# Patient Record
Sex: MALE | Race: BLACK OR AFRICAN AMERICAN | Employment: FULL TIME | URBAN - METROPOLITAN AREA
[De-identification: names, ages, dates, MRNs, and addresses within clinical notes are randomized per-mention and may not be internally consistent; named-entity substitution may affect disease eponyms.]

---

## 2017-01-26 ENCOUNTER — HOSPITAL ENCOUNTER (EMERGENCY)
Age: 22
Discharge: HOME | End: 2017-01-26
Attending: PEDIATRICS | Admitting: PEDIATRICS
Payer: SELF-PAY

## 2017-01-26 VITALS
WEIGHT: 192.68 LBS | RESPIRATION RATE: 18 BRPM | TEMPERATURE: 98.2 F | DIASTOLIC BLOOD PRESSURE: 77 MMHG | BODY MASS INDEX: 23.15 KG/M2 | SYSTOLIC BLOOD PRESSURE: 122 MMHG | HEART RATE: 74 BPM

## 2017-01-26 DIAGNOSIS — R21 RASH: Primary | ICD-10-CM

## 2017-01-26 PROCEDURE — 99282 EMERGENCY DEPT VISIT SF MDM: CPT

## 2017-01-26 RX ORDER — TRIAMCINOLONE ACETONIDE 1 MG/G
OINTMENT TOPICAL 2 TIMES DAILY
Qty: 30 G | Refills: 1 | Status: SHIPPED | OUTPATIENT
Start: 2017-01-26 | End: 2021-03-02 | Stop reason: CLARIF

## 2017-01-26 NOTE — ED PROVIDER NOTES
HPI Comments: 24 y/o AA male presents to the ED for the evaluation of rash to bilateral feet. According to patient, the rash started about one month ago. Patient states the rash is dry and itchy. It went away on its own but then returned. Has been putting anti-fungal cream on it with no relief. He does mention he plays a lot of basketball. Cannot recall any new detergents, clothes, socks or other possible irritants. No other acute medical complaints expressed at this time. The history is provided by the patient. Past Medical History:   Diagnosis Date    Asthma        Past Surgical History:   Procedure Laterality Date    Hx tonsillectomy  2005?  Hx orthopaedic       left knee surgery         History reviewed. No pertinent family history. Social History     Social History    Marital status: SINGLE     Spouse name: N/A    Number of children: N/A    Years of education: N/A     Occupational History    Not on file. Social History Main Topics    Smoking status: Never Smoker    Smokeless tobacco: Never Used    Alcohol use No    Drug use: Not on file    Sexual activity: Not on file     Other Topics Concern    Not on file     Social History Narrative    ** Merged History Encounter **              ALLERGIES: Grape; Milk; Nickel; and Wheat    Review of Systems   Constitutional: Negative. Negative for chills and fever. HENT: Negative. Eyes: Negative. Cardiovascular: Negative for leg swelling. Gastrointestinal: Negative for abdominal pain, nausea and vomiting. Genitourinary: Negative. Musculoskeletal: Negative. Skin: Positive for rash. Negative for wound. Neurological: Negative for light-headedness. Psychiatric/Behavioral: Negative. All other systems reviewed and are negative.       Vitals:    01/26/17 1104 01/26/17 1111   BP:  122/77   Pulse:  74   Resp:  18   Temp:  98.2 °F (36.8 °C)   Weight: 87.4 kg (192 lb 10.9 oz)             Physical Exam   Constitutional: He is oriented to person, place, and time. He appears well-developed and well-nourished. No distress. HENT:   Head: Normocephalic and atraumatic. Eyes: Conjunctivae and EOM are normal. Pupils are equal, round, and reactive to light. Neck: Normal range of motion. Neck supple. Cardiovascular: Normal rate, regular rhythm, normal heart sounds and intact distal pulses. Exam reveals no gallop and no friction rub. No murmur heard. Pulmonary/Chest: Effort normal and breath sounds normal. No respiratory distress. He has no wheezes. He has no rales. He exhibits no tenderness. Musculoskeletal: Normal range of motion. Neurological: He is alert and oriented to person, place, and time. He has normal reflexes. Skin: Skin is warm and dry. Rash (dry, scaly erythematous plaque on bilateral feet with some weeping.  ) noted. No erythema. Psychiatric: He has a normal mood and affect. His behavior is normal.   Nursing note and vitals reviewed. MDM  ED Course       Procedures    Rash concerning for ?? contact dermatitis, however patient has hx of eczema so could be eczema flare.   Will d/c home with triamcinolone ointment and podiatry referral if symptoms persist  Patient verbalizes understanding of dx and is aware of what s/sx to monitor that would warrant return visit to ED  Discussed with Dr. Michelle Sogn

## 2017-01-26 NOTE — DISCHARGE INSTRUCTIONS

## 2017-02-18 ENCOUNTER — APPOINTMENT (OUTPATIENT)
Dept: GENERAL RADIOLOGY | Age: 22
End: 2017-02-18
Attending: NURSE PRACTITIONER
Payer: SELF-PAY

## 2017-02-18 ENCOUNTER — HOSPITAL ENCOUNTER (EMERGENCY)
Age: 22
Discharge: HOME | End: 2017-02-18
Attending: PEDIATRICS
Payer: SELF-PAY

## 2017-02-18 VITALS
OXYGEN SATURATION: 99 % | SYSTOLIC BLOOD PRESSURE: 142 MMHG | DIASTOLIC BLOOD PRESSURE: 90 MMHG | BODY MASS INDEX: 23.25 KG/M2 | RESPIRATION RATE: 16 BRPM | HEART RATE: 78 BPM | WEIGHT: 193.56 LBS | TEMPERATURE: 98.5 F

## 2017-02-18 DIAGNOSIS — M62.838 MUSCLE SPASM: ICD-10-CM
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
## 2017-02-18 DIAGNOSIS — M25.562 ACUTE PAIN OF LEFT KNEE: ICD-10-CM

## 2017-02-18 PROCEDURE — 99283 EMERGENCY DEPT VISIT LOW MDM: CPT

## 2017-02-18 PROCEDURE — 74011250637 HC RX REV CODE- 250/637: Performed by: NURSE PRACTITIONER

## 2017-02-18 PROCEDURE — 73562 X-RAY EXAM OF KNEE 3: CPT

## 2017-02-18 RX ORDER — BUTALBITAL, ACETAMINOPHEN AND CAFFEINE 50; 325; 40 MG/1; MG/1; MG/1
1 TABLET ORAL
Qty: 20 TAB | Refills: 0 | Status: SHIPPED | OUTPATIENT
Start: 2017-02-18 | End: 2018-04-10

## 2017-02-18 RX ORDER — CYCLOBENZAPRINE HCL 10 MG
10 TABLET ORAL
Qty: 20 TAB | Refills: 0 | Status: SHIPPED | OUTPATIENT
Start: 2017-02-18 | End: 2018-04-10

## 2017-02-18 RX ORDER — IBUPROFEN 800 MG/1
800 TABLET ORAL
Qty: 30 TAB | Refills: 0 | Status: SHIPPED | OUTPATIENT
Start: 2017-02-18 | End: 2017-02-23

## 2017-02-18 RX ORDER — BUTALBITAL, ACETAMINOPHEN AND CAFFEINE 50; 325; 40 MG/1; MG/1; MG/1
2 TABLET ORAL
Status: COMPLETED | OUTPATIENT
Start: 2017-02-18 | End: 2017-02-18

## 2017-02-18 RX ORDER — CYCLOBENZAPRINE HCL 10 MG
10 TABLET ORAL
Status: COMPLETED | OUTPATIENT
Start: 2017-02-18 | End: 2017-02-18

## 2017-02-18 RX ORDER — IBUPROFEN 400 MG/1
800 TABLET ORAL
Status: COMPLETED | OUTPATIENT
Start: 2017-02-18 | End: 2017-02-18

## 2017-02-18 RX ADMIN — IBUPROFEN 800 MG: 400 TABLET, FILM COATED ORAL at 22:45

## 2017-02-18 RX ADMIN — BUTALBITAL, ACETAMINOPHEN AND CAFFEINE 2 TABLET: 50; 325; 40 TABLET ORAL at 22:45

## 2017-02-18 RX ADMIN — CYCLOBENZAPRINE HYDROCHLORIDE 10 MG: 10 TABLET, FILM COATED ORAL at 22:45

## 2017-02-18 NOTE — LETTER
[address] 
[address] [facility] DEPT 
[address] [place] [address] [facility] [id number] 
[phone] Work/School Note Date: 2/18/2017 To Whom It May concern: 
 
Allen Cohen was seen and treated today in the emergency room by the following provider(s): 
Attending Provider: Nikki Schwartz MD 
Nurse Practitioner: Dennys Guerrero NP. Allen Cohen may return to work on 2/20/17. Sincerely, Dennys Guerrero NP

## 2017-02-19 NOTE — ED NOTES
Pt discharged home with parent/guardian. Pt acting age appropriately, respirations regular and unlabored, ambulates with steady gait. No further complaints at this time. Parent/guardian verbalized understanding of discharge paperwork and has no further questions at this time. Education provided about continuation of care, follow up care and medication administration. Parent/guardian able to provided teach back about discharge instructions.
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
## 2017-02-23 ENCOUNTER — HOSPITAL ENCOUNTER (EMERGENCY)
Age: 22
Discharge: HOME | End: 2017-02-23
Attending: PEDIATRICS
Payer: SUBSIDIZED

## 2017-02-23 VITALS
OXYGEN SATURATION: 99 % | BODY MASS INDEX: 23.15 KG/M2 | TEMPERATURE: 98.1 F | HEART RATE: 78 BPM | SYSTOLIC BLOOD PRESSURE: 116 MMHG | DIASTOLIC BLOOD PRESSURE: 63 MMHG | RESPIRATION RATE: 16 BRPM | WEIGHT: 192.68 LBS

## 2017-02-23 DIAGNOSIS — J02.9 PHARYNGITIS, UNSPECIFIED ETIOLOGY: Primary | ICD-10-CM

## 2017-02-23 LAB — S PYO AG THROAT QL: NEGATIVE

## 2017-02-23 PROCEDURE — 74011250637 HC RX REV CODE- 250/637: Performed by: PEDIATRICS

## 2017-02-23 PROCEDURE — 99283 EMERGENCY DEPT VISIT LOW MDM: CPT

## 2017-02-23 PROCEDURE — 87070 CULTURE OTHR SPECIMN AEROBIC: CPT | Performed by: PEDIATRICS

## 2017-02-23 PROCEDURE — 87880 STREP A ASSAY W/OPTIC: CPT

## 2017-02-23 RX ORDER — IBUPROFEN 400 MG/1
800 TABLET ORAL
Status: COMPLETED | OUTPATIENT
Start: 2017-02-23 | End: 2017-02-23

## 2017-02-23 RX ORDER — IBUPROFEN 800 MG/1
800 TABLET ORAL
Qty: 50 TAB | Refills: 0 | Status: SHIPPED | OUTPATIENT
Start: 2017-02-23 | End: 2018-09-18

## 2017-02-23 RX ADMIN — IBUPROFEN 800 MG: 400 TABLET, FILM COATED ORAL at 22:23

## 2017-02-24 NOTE — ED NOTES
Pt discharged home. Pt acting age appropriately, respirations regular and unlabored. No distress noted. No further complaints at this time. Pt verbalized understanding of discharge paperwork and has no further questions at this time. Education provided about continuation of care, follow up care with PCP and medication administration. Pt able to provided teach back about discharge instructions.

## 2017-02-24 NOTE — ED TRIAGE NOTES
Pt with sore throat when he swallows since yesterday. Pt denies fever. States something white in throat.

## 2017-02-24 NOTE — ED PROVIDER NOTES
Patient is a 25 y.o. male presenting with sore throat. The history is provided by the patient. Sore Throat    This is a new problem. The current episode started yesterday. The problem has not changed since onset. There has been no fever. Associated symptoms include headaches. Pertinent negatives include no diarrhea, no vomiting, no congestion, no drooling, no ear discharge, no ear pain, no plugged ear sensation, no shortness of breath, no stridor, no swollen glands, no trouble swallowing, no stiff neck and no cough. He has had no exposure to strep or mono. He has tried nothing for the symptoms. History of tonsillectomy 11 years ago. No trauma    Past Medical History:   Diagnosis Date    Asthma        Past Surgical History:   Procedure Laterality Date    HX ORTHOPAEDIC      left knee surgery    HX TONSILLECTOMY  2005? History reviewed. No pertinent family history. Social History     Social History    Marital status: SINGLE     Spouse name: N/A    Number of children: N/A    Years of education: N/A     Occupational History    Not on file. Social History Main Topics    Smoking status: Current Some Day Smoker    Smokeless tobacco: Never Used    Alcohol use No    Drug use: Not on file    Sexual activity: Not on file     Other Topics Concern    Not on file     Social History Narrative    ** Merged History Encounter **              ALLERGIES: Grape; Milk; Nickel; and Wheat    Review of Systems   HENT: Positive for sore throat. Negative for congestion, drooling, ear discharge, ear pain and trouble swallowing. Respiratory: Negative for cough, shortness of breath and stridor. Gastrointestinal: Negative for diarrhea and vomiting. Neurological: Positive for headaches. All other systems reviewed and are negative.       Vitals:    02/23/17 2154   BP: 116/63   Pulse: 78   Resp: 16   Temp: 98.1 °F (36.7 °C)   SpO2: 99%   Weight: 87.4 kg (192 lb 10.9 oz)            Physical Exam   Physical Exam   Constitutional: Appears well-developed and well-nourished. Active. No distress. HENT:   Head: NCAT  Ears: Right Ear: Tympanic membrane normal. Left Ear: Tympanic membrane normal.   Nose: Nose normal. No nasal discharge. turbinates boggy  Mouth/Throat: Mucous membranes are moist. Pharynx is injected. No tonsils. Eyes: Conjunctivae are normal. Right eye exhibits no discharge. Left eye exhibits no discharge. Neck: Normal range of motion. Neck supple. Cardiovascular: Normal rate, regular rhythm, S1 normal and S2 normal.  .       2+ distal pulses   Pulmonary/Chest: Effort normal and breath sounds normal. No nasal flaring or stridor. No respiratory distress. no wheezes. no rhonchi. no rales. no retraction. Abdominal: Soft. . No tenderness. no guarding. No hernia. No masses or HSM  Musculoskeletal: Normal range of motion. no edema, no tenderness, no deformity and no signs of injury. Lymphadenopathy:     no cervical adenopathy. Neurological:  alert. normal strength. normal muscle tone. No focal defecits  Skin: Skin is warm and dry. Capillary refill takes less than 3 seconds. Turgor is normal. No petechiae, no purpura and no rash noted. No cyanosis. MDM  ED Course   Patient is well hydrated, well appearing, and in no respiratory distress. Physical exam is reassuring, and without signs of serious illness. Rapid strep negative. No trismus, stridor or increased work of breathing associated with sore throat. Differential diagnosis includes viral pharyngitis, mononucleosis, strep pharyngitis with negative POC strep. Will discharge with supportive care pending throat culture. Also, Symptoms likely secondary to post-nasal drainage due to allergic rhinitis. Will discharge patient home with flonase and zyrtec, and follow-up with PCP within the next few days. ICD-10-CM ICD-9-CM   1.  Pharyngitis, unspecified etiology J02.9 462       Current Discharge Medication List      START taking these medications Details   Cetirizine (ZYRTEC) 10 mg cap Take 10 mg by mouth daily. Qty: 30 Cap, Refills: 0         CONTINUE these medications which have CHANGED    Details   ibuprofen (MOTRIN) 800 mg tablet Take 1 Tab by mouth every six (6) hours as needed for Pain. Qty: 50 Tab, Refills: 0         CONTINUE these medications which have NOT CHANGED    Details   triamcinolone acetonide (KENALOG) 0.1 % ointment Apply  to affected area two (2) times a day. use thin layer to affected area  Qty: 30 g, Refills: 1      cyclobenzaprine (FLEXERIL) 10 mg tablet Take 1 Tab by mouth three (3) times daily as needed for Muscle Spasm(s). Qty: 20 Tab, Refills: 0      butalbital-acetaminophen-caffeine (FIORICET, ESGIC) -40 mg per tablet Take 1 Tab by mouth every six (6) hours as needed for Pain or Headache. Max Daily Amount: 4 Tabs. Qty: 20 Tab, Refills: 0      EPINEPHrine (AUVI-Q) 0.3 mg/0.3 mL (1:1,000) injection 0.3 mL by IntraMUSCular route once as needed for up to 1 dose. Qty: 0.6 mL, Refills: 0      montelukast (SINGULAIR) 10 mg tablet Take 10 mg by mouth daily. albuterol (PROVENTIL HFA, VENTOLIN HFA) 90 mcg/actuation inhaler Take  by inhalation. fluticasone (FLONASE) 50 mcg/actuation nasal spray nightly. albuterol sulfate (PROVENTIL;VENTOLIN) 2.5 mg/0.5 mL Nebu nebulizer solution by Nebulization route once. Follow-up Information     Follow up With Details Comments [address], [place] In 3 days  [address] [facility] [address]  [phone]            I have reviewed discharge instructions with the patient. The patient verbalized understanding. 10:42 PM  Gela Gamino M.D.       Procedures

## 2017-02-24 NOTE — DISCHARGE INSTRUCTIONS
Please continue taking your flonase and start Zyrtec as well. You have some mild post nasal drip as well. Sore Throat: Care Instructions  Your Care Instructions    Infection by bacteria or a virus causes most sore throats. Cigarette smoke, dry air, air pollution, allergies, and yelling can also cause a sore throat. Sore throats can be painful and annoying. Fortunately, most sore throats go away on their own. If you have a bacterial infection, your doctor may prescribe antibiotics. Follow-up care is a key part of your treatment and safety. Be sure to make and go to all appointments, and call your doctor if you are having problems. It's also a good idea to know your test results and keep a list of the medicines you take. How can you care for yourself at home? · If your doctor prescribed antibiotics, take them as directed. Do not stop taking them just because you feel better. You need to take the full course of antibiotics. · Gargle with warm salt water once an hour to help reduce swelling and relieve discomfort. Use 1 teaspoon of salt mixed in 1 cup of warm water. · Take an over-the-counter pain medicine, such as acetaminophen (Tylenol), ibuprofen (Advil, Motrin), or naproxen (Aleve). Read and follow all instructions on the label. · Be careful when taking over-the-counter cold or flu medicines and Tylenol at the same time. Many of these medicines have acetaminophen, which is Tylenol. Read the labels to make sure that you are not taking more than the recommended dose. Too much acetaminophen (Tylenol) can be harmful. · Drink plenty of fluids. Fluids may help soothe an irritated throat. Hot fluids, such as tea or soup, may help decrease throat pain. · Use over-the-counter throat lozenges to soothe pain. Regular cough drops or hard candy may also help. These should not be given to young children because of the risk of choking. · Do not smoke or allow others to smoke around you.  If you need help quitting, talk to your doctor about stop-smoking programs and medicines. These can increase your chances of quitting for good. · Use a vaporizer or humidifier to add moisture to your bedroom. Follow the directions for cleaning the machine. When should you call for help? Call your doctor now or seek immediate medical care if:  · You have new or worse trouble swallowing. · Your sore throat gets much worse on one side. Watch closely for changes in your health, and be sure to contact your doctor if you do not get better as expected. Where can you learn more? Go to http://kala-maura.info/. Enter [phone] [id number] in the search box to learn more about \"Sore Throat: Care Instructions. \"  Current as of: July 29, 2016  Content Version: 11.1  © 6309-0725 [employer]. Care instructions adapted under license by [employer] (which disclaims liability or warranty for this information). If you have questions about a medical condition or this instruction, always ask your healthcare professional. [place] [address] any warranty or liability for your use of this information. Rhinitis: Care Instructions  Your Care Instructions  Rhinitis is swelling and irritation in the nose. Allergies and infections are often the cause. Your nose may run or feel stuffy. Other symptoms are itchy and sore eyes, ears, throat, and mouth. If allergies are the cause, your doctor may do tests to find out what you are allergic to. You may be able to stop symptoms if you avoid the things that cause them. Your doctor may suggest or prescribe medicine to ease your symptoms. Follow-up care is a key part of your treatment and safety. Be sure to make and go to all appointments, and call your doctor if you are having problems. It's also a good idea to know your test results and keep a list of the medicines you take. How can you care for yourself at home?   · If your rhinitis is caused by allergies, try to find out what sets off (triggers) your symptoms. Take steps to avoid these triggers. ¨ Avoid yard work. It can stir up both pollen and mold. ¨ Do not smoke or allow others to smoke around you. If you need help quitting, talk to your doctor about stop-smoking programs and medicines. These can increase your chances of quitting for good. ¨ Do not use aerosol sprays, cleaning products, or perfumes. ¨ If pollen is one of your triggers, close your house and car windows during blooming season. ¨ Clean your house often to control dust.  ¨ Keep pets outside. · If your doctor recommends over-the-counter medicines to relieve symptoms, take your medicines exactly as prescribed. Call your doctor if you think you are having a problem with your medicine. · Use saline (saltwater) nasal washes to help keep your nasal passages open and wash out mucus and bacteria. You can buy saline nose drops at a grocery store or drugstore. Or you can make your own at home by adding 1 teaspoon of salt and 1 teaspoon of baking soda to 2 cups of distilled water. If you make your own, fill a bulb syringe with the solution, insert the tip into your nostril, and squeeze gently. Dung Opitz your nose. When should you call for help? Call your doctor now or seek immediate medical care if:  · You are having trouble breathing. Watch closely for changes in your health, and be sure to contact your doctor if:  · Mucus from your nose gets thicker (like pus) or has new blood in it. · You have new or worse symptoms. · You do not get better as expected. Where can you learn more? Go to http://kala-maura.info/. Enter M030 in the search box to learn more about \"Rhinitis: Care Instructions. \"  Current as of: July 29, 2016  Content Version: 11.1  © 2096-6765 [employer]. Care instructions adapted under license by [employer] (which disclaims liability or warranty for this information).  If you have questions about a medical condition or this instruction, always ask your healthcare professional. [place] [address] any warranty or liability for your use of this information. We hope that we have addressed all of your medical concerns. The examination and treatment you received in the Emergency Department were for an emergent problem and were not intended as complete care. It is important that you follow up with your healthcare provider(s) for ongoing care. If your symptoms worsen or do not improve as expected, and you are unable to reach your usual health care provider(s), you should return to the Emergency Department. Today's healthcare is undergoing tremendous change, and patient satisfaction surveys are one of the many tools to assess the quality of medical care. You may receive a survey from the [employer] regarding your experience in the Emergency Department. I hope that your experience has been completely positive, particularly the medical care that I provided. As such, please participate in the survey; anything less than excellent does not meet my expectations or intentions. Thank you for allowing us to provide you with medical care today. We realize that you have many choices for your emergency care needs. Please choose us in the future for any continued health care needs.       Anthony Gloria MD    [address] [facility].   Office: [phone]            Recent Results (from the past 24 hour(s))   POC GROUP A STREP    Collection Time: 02/23/17 10:30 PM   Result Value Ref Range    Group A strep (POC) NEGATIVE  NEG

## 2017-02-25 LAB
BACTERIA SPEC CULT: NORMAL
SERVICE CMNT-IMP: NORMAL

## 2018-04-10 ENCOUNTER — APPOINTMENT (OUTPATIENT)
Dept: GENERAL RADIOLOGY | Age: 23
End: 2018-04-10
Attending: PHYSICIAN ASSISTANT
Payer: COMMERCIAL

## 2018-04-10 ENCOUNTER — HOSPITAL ENCOUNTER (EMERGENCY)
Age: 23
Discharge: HOME | End: 2018-04-10
Attending: EMERGENCY MEDICINE | Admitting: EMERGENCY MEDICINE
Payer: COMMERCIAL

## 2018-04-10 VITALS
RESPIRATION RATE: 14 BRPM | OXYGEN SATURATION: 100 % | SYSTOLIC BLOOD PRESSURE: 153 MMHG | WEIGHT: 180.56 LBS | BODY MASS INDEX: 21.69 KG/M2 | TEMPERATURE: 98 F | HEART RATE: 74 BPM | DIASTOLIC BLOOD PRESSURE: 81 MMHG

## 2018-04-10 DIAGNOSIS — R07.81 RIB PAIN ON LEFT SIDE: Primary | ICD-10-CM

## 2018-04-10 PROCEDURE — 99282 EMERGENCY DEPT VISIT SF MDM: CPT

## 2018-04-10 PROCEDURE — 71101 X-RAY EXAM UNILAT RIBS/CHEST: CPT

## 2018-04-10 RX ORDER — CYCLOBENZAPRINE HCL 10 MG
10 TABLET ORAL
Qty: 10 TAB | Refills: 0 | Status: SHIPPED | OUTPATIENT
Start: 2018-04-10 | End: 2018-07-25

## 2018-04-10 NOTE — ED NOTES
Assumed care of pt. Pt resting comfortably on stretcher at this time. Pt updated on plan of care and denies any needs at this time. Will continue to monitor.

## 2018-04-10 NOTE — ED PROVIDER NOTES
HPI Comments: Melissa Nicholas is a 23 y.o. male  who presents by private vehicle to ER with c/o Patient presents with:  Rib Pain. Patient with left rib pain x 1.5 months. Patient reports falling back while playing basketball 1.5 months ago. Denies any shortness of breath. Patient has not tried any medication for pain. Patient is UTD on immunizations and denies any significant medical history. He specifically denies any fevers, chills, nausea, vomiting, chest pain, shortness of breath, headache, rash, diarrhea, abdominal pain, urinary/bowel changes, sweating or weight loss. PCP: None   PMHx significant for: Past Medical History:  No date: Asthma   PSHx significant for: Past Surgical History:  No date: HX ORTHOPAEDIC      Comment: left knee surgery  2005?: HX TONSILLECTOMY  Social Hx: Tobacco use: Smoking status: Current Some Day Smoker                                                    Packs/day: 0.00      Years: 0.00      Smokeless status: Never Used                      ; EtOH use: The patient states he drinks 0 per week.; Illicit Drug use: Allergies:   -- Grape -- Other (comments)    --  Per allergy testing   -- Milk -- Other (comments)    --  Per allergy testing   -- Nickel -- Rash   -- Wheat -- Other (comments)    --  Per allergy testing    There are no other complaints, changes or physical findings at this time. Patient is a 23 y.o. male presenting with rib pain. The history is provided by the patient. Rib Pain   This is a new problem. The current episode started more than 1 week ago. The problem has not changed since onset. Associated symptoms include chest pain. Pertinent negatives include no fever, no coryza, no swollen glands, no cough, no wheezing, no abdominal pain, no leg swelling and no claudication. He has tried nothing for the symptoms. He has had no prior hospitalizations. He has had no prior ED visits. He has had no prior ICU admissions.  Associated medical issues do not include asthma, COPD, chronic lung disease, past MI or DVT. Past Medical History:   Diagnosis Date    Asthma        Past Surgical History:   Procedure Laterality Date    HX ORTHOPAEDIC      left knee surgery    HX TONSILLECTOMY  2005? History reviewed. No pertinent family history. Social History     Social History    Marital status: SINGLE     Spouse name: N/A    Number of children: N/A    Years of education: N/A     Occupational History    Not on file. Social History Main Topics    Smoking status: Current Some Day Smoker    Smokeless tobacco: Never Used    Alcohol use No    Drug use: Not on file    Sexual activity: Not on file     Other Topics Concern    Not on file     Social History Narrative    ** Merged History Encounter **              ALLERGIES: Grape; Milk; Nickel; and Wheat    Review of Systems   Constitutional: Negative. Negative for fever. HENT: Negative. Eyes: Negative. Respiratory: Negative. Negative for cough and wheezing. Cardiovascular: Positive for chest pain. Negative for claudication and leg swelling. Gastrointestinal: Negative. Negative for abdominal pain. Endocrine: Negative. Genitourinary: Negative. Musculoskeletal: Negative. Skin: Negative. Allergic/Immunologic: Negative. Neurological: Negative. Hematological: Negative. Psychiatric/Behavioral: Negative. All other systems reviewed and are negative. Vitals:    04/10/18 1913 04/10/18 1914   BP: 153/81    Pulse: 74    Resp: 14    Temp: 98 °F (36.7 °C)    SpO2: 100%    Weight:  81.9 kg (180 lb 8.9 oz)            Physical Exam   Constitutional: He is oriented to person, place, and time. He appears well-developed and well-nourished. HENT:   Head: Normocephalic and atraumatic. Right Ear: External ear normal.   Left Ear: External ear normal.   Mouth/Throat: Oropharynx is clear and moist. No oropharyngeal exudate.    Eyes: Conjunctivae and EOM are normal. Pupils are equal, round, and reactive to light. Right eye exhibits no discharge. Left eye exhibits no discharge. No scleral icterus. Neck: Normal range of motion. Neck supple. No tracheal deviation present. No thyromegaly present. Cardiovascular: Normal rate, regular rhythm, normal heart sounds and intact distal pulses. No murmur heard. Pulmonary/Chest: Effort normal and breath sounds normal. No respiratory distress. He has no wheezes. He has no rales. Abdominal: Soft. Bowel sounds are normal. He exhibits no distension. There is no tenderness. There is no rebound and no guarding. Musculoskeletal: Normal range of motion. He exhibits no edema or tenderness. Lymphadenopathy:     He has no cervical adenopathy. Neurological: He is alert and oriented to person, place, and time. No cranial nerve deficit. Coordination normal.   Skin: Skin is warm. No rash noted. No erythema. Psychiatric: He has a normal mood and affect. His behavior is normal. Judgment and thought content normal.   Nursing note and vitals reviewed. MDM  Number of Diagnoses or Management Options  Rib pain on left side:   Diagnosis management comments: Assesment/Plan- 23 y.o. Patient presents with:  Rib Pain  differential includes: contusion, rib fracture. imaging reviewed with no acute findings. Recommend PCP follow up. Patient educated on reasons to return to the ED.          Amount and/or Complexity of Data Reviewed  Tests in the radiology section of CPT®: ordered and reviewed          ED Course       Procedures

## 2018-04-10 NOTE — ED NOTES
Bedside and Verbal shift change report given to Santa Aguirre RN (oncoming nurse) by Aminah Packer RN (offgoing nurse). Report included the following information ED Summary.

## 2018-04-10 NOTE — DISCHARGE INSTRUCTIONS
Musculoskeletal Chest Pain: Care Instructions  Your Care Instructions    Chest pain is not always a sign that something is wrong with your heart or that you have another serious problem. The doctor thinks your chest pain is caused by strained muscles or ligaments, inflamed chest cartilage, or another problem in your chest, rather than by your heart. You may need more tests to find the cause of your chest pain. Follow-up care is a key part of your treatment and safety. Be sure to make and go to all appointments, and call your doctor if you are having problems. It's also a good idea to know your test results and keep a list of the medicines you take. How can you care for yourself at home? · Take pain medicines exactly as directed. ¨ If the doctor gave you a prescription medicine for pain, take it as prescribed. ¨ If you are not taking a prescription pain medicine, ask your doctor if you can take an over-the-counter medicine. · Rest and protect the sore area. · Stop, change, or take a break from any activity that may be causing your pain or soreness. · Put ice or a cold pack on the sore area for 10 to 20 minutes at a time. Try to do this every 1 to 2 hours for the next 3 days (when you are awake) or until the swelling goes down. Put a thin cloth between the ice and your skin. · After 2 or 3 days, apply a heating pad set on low or a warm cloth to the area that hurts. Some doctors suggest that you go back and forth between hot and cold. · Do not wrap or tape your ribs for support. This may cause you to take smaller breaths, which could increase your risk of lung problems. · Mentholated creams such as Bengay or Icy Hot may soothe sore muscles. Follow the instructions on the package. · Follow your doctor's instructions for exercising. · Gentle stretching and massage may help you get better faster. Stretch slowly to the point just before pain begins, and hold the stretch for at least 15 to 30 seconds.  Do this 3 or 4 times a day. Stretch just after you have applied heat. · As your pain gets better, slowly return to your normal activities. Any increased pain may be a sign that you need to rest a while longer. When should you call for help? Call 911 anytime you think you may need emergency care. For example, call if:  ? · You have chest pain or pressure. This may occur with:  ¨ Sweating. ¨ Shortness of breath. ¨ Nausea or vomiting. ¨ Pain that spreads from the chest to the neck, jaw, or one or both shoulders or arms. ¨ Dizziness or lightheadedness. ¨ A fast or uneven pulse. After calling 911, chew 1 adult-strength aspirin. Wait for an ambulance. Do not try to drive yourself. ? · You have sudden chest pain and shortness of breath, or you cough up blood. ?Call your doctor now or seek immediate medical care if:  ? · You have any trouble breathing. ? · Your chest pain gets worse. ? · Your chest pain occurs consistently with exercise and is relieved by rest.   ? Watch closely for changes in your health, and be sure to contact your doctor if:  ? · Your chest pain does not get better after 1 week. Where can you learn more? Go to http://kala-maura.info/. Enter V293 in the search box to learn more about \"Musculoskeletal Chest Pain: Care Instructions. \"  Current as of: March 20, 2017  Content Version: 11.4  © 5678-9805 [employer]. Care instructions adapted under license by [employer] (which disclaims liability or warranty for this information). If you have questions about a medical condition or this instruction, always ask your healthcare professional. [address] any warranty or liability for your use of this information. We hope that we have addressed all of your medical concerns. The examination and treatment you received in the Emergency Department were for an emergent problem and were not intended as complete care.  It is important that you follow up with your healthcare provider(s) for ongoing care. If your symptoms worsen or do not improve as expected, and you are unable to reach your usual health care provider(s), you should return to the Emergency Department. Today's healthcare is undergoing tremendous change, and patient satisfaction surveys are one of the many tools to assess the quality of medical care. You may receive a survey from the [employer] regarding your experience in the Emergency Department. I hope that your experience has been completely positive, particularly the medical care that I provided. As such, please participate in the survey; anything less than excellent does not meet my expectations or intentions. [address] [facility] and [facility] participate in nationally recognized quality of care measures. If your blood pressure is greater than 120/80, as reported below, we urge that you seek medical care to address the potential of high blood pressure, commonly known as hypertension. Hypertension can be hereditary or can be caused by certain medical conditions, pain, stress, or \"white coat syndrome. \"       Please make an appointment with your health care provider(s) for follow up of your Emergency Department visit. VITALS:   Patient Vitals for the past 8 hrs:   Temp Pulse Resp BP SpO2   04/10/18 1913 98 °F (36.7 °C) 74 14 153/81 100 %          Thank you for allowing us to provide you with medical care today. We realize that you have many choices for your emergency care needs. Please choose us in the future for any continued health care needs. Deneen Ulloa, [address] [facility]: [phone]            No results found for this or any previous visit (from the past 24 hour(s)).     Xr Ribs Lt W Pa Cxr Min 3 V    Result Date: 4/10/2018  EXAM:  XR RIBS LT W PA CXR MIN 3 V INDICATION: Pain in the left chest wall since fall 1.5 months ago. COMPARISON: Chest views on 10/27/2009 TECHNIQUE: PA chest and 3 views of the left ribs FINDINGS: The cardiomediastinal and hilar contours are within normal limits. The lungs and pleural spaces are clear bilaterally. The bones and upper abdomen are age-appropriate. No evidence of rib fracture. IMPRESSION: No displaced rib fracture or pneumothorax. No change given difference in technique.

## 2018-07-25 ENCOUNTER — HOSPITAL ENCOUNTER (EMERGENCY)
Age: 23
Discharge: HOME | End: 2018-07-25
Attending: EMERGENCY MEDICINE
Payer: SELF-PAY

## 2018-07-25 ENCOUNTER — APPOINTMENT (OUTPATIENT)
Dept: GENERAL RADIOLOGY | Age: 23
End: 2018-07-25
Attending: EMERGENCY MEDICINE
Payer: SELF-PAY

## 2018-07-25 ENCOUNTER — APPOINTMENT (OUTPATIENT)
Dept: CT IMAGING | Age: 23
End: 2018-07-25
Attending: EMERGENCY MEDICINE
Payer: SELF-PAY

## 2018-07-25 VITALS
DIASTOLIC BLOOD PRESSURE: 72 MMHG | HEART RATE: 79 BPM | SYSTOLIC BLOOD PRESSURE: 128 MMHG | RESPIRATION RATE: 18 BRPM | BODY MASS INDEX: 20.34 KG/M2 | TEMPERATURE: 98.1 F | WEIGHT: 169.31 LBS | OXYGEN SATURATION: 98 %

## 2018-07-25 DIAGNOSIS — R10.84 ABDOMINAL PAIN, GENERALIZED: Primary | ICD-10-CM

## 2018-07-25 LAB
ALBUMIN SERPL-MCNC: 4.2 G/DL (ref 3.5–5)
ALBUMIN/GLOB SERPL: 1.3 {RATIO} (ref 1.1–2.2)
ALP SERPL-CCNC: 42 U/L (ref 45–117)
ALT SERPL-CCNC: 22 U/L (ref 12–78)
ANION GAP SERPL CALC-SCNC: 6 MMOL/L (ref 5–15)
AST SERPL-CCNC: 16 U/L (ref 15–37)
BASOPHILS # BLD: 0 K/UL (ref 0–0.1)
BASOPHILS NFR BLD: 1 % (ref 0–1)
BILIRUB SERPL-MCNC: 0.9 MG/DL (ref 0.2–1)
BUN SERPL-MCNC: 9 MG/DL (ref 6–20)
BUN/CREAT SERPL: 8 (ref 12–20)
CALCIUM SERPL-MCNC: 9.2 MG/DL (ref 8.5–10.1)
CHLORIDE SERPL-SCNC: 106 MMOL/L (ref 97–108)
CO2 SERPL-SCNC: 28 MMOL/L (ref 21–32)
CREAT SERPL-MCNC: 1.06 MG/DL (ref 0.7–1.3)
DIFFERENTIAL METHOD BLD: NORMAL
EOSINOPHIL # BLD: 0.1 K/UL (ref 0–0.4)
EOSINOPHIL NFR BLD: 2 % (ref 0–7)
ERYTHROCYTE [DISTWIDTH] IN BLOOD BY AUTOMATED COUNT: 11.9 % (ref 11.5–14.5)
GLOBULIN SER CALC-MCNC: 3.2 G/DL (ref 2–4)
GLUCOSE SERPL-MCNC: 80 MG/DL (ref 65–100)
HCT VFR BLD AUTO: 40 % (ref 36.6–50.3)
HGB BLD-MCNC: 13.7 G/DL (ref 12.1–17)
IMM GRANULOCYTES # BLD: 0 K/UL (ref 0–0.04)
IMM GRANULOCYTES NFR BLD AUTO: 0 % (ref 0–0.5)
LIPASE SERPL-CCNC: 117 U/L (ref 73–393)
LYMPHOCYTES # BLD: 2 K/UL (ref 0.8–3.5)
LYMPHOCYTES NFR BLD: 32 % (ref 12–49)
MCH RBC QN AUTO: 31.9 PG (ref 26–34)
MCHC RBC AUTO-ENTMCNC: 34.3 G/DL (ref 30–36.5)
MCV RBC AUTO: 93.2 FL (ref 80–99)
MONOCYTES # BLD: 0.5 K/UL (ref 0–1)
MONOCYTES NFR BLD: 8 % (ref 5–13)
NEUTS SEG # BLD: 3.6 K/UL (ref 1.8–8)
NEUTS SEG NFR BLD: 58 % (ref 32–75)
NRBC # BLD: 0 K/UL (ref 0–0.01)
NRBC BLD-RTO: 0 PER 100 WBC
PLATELET # BLD AUTO: 210 K/UL (ref 150–400)
PMV BLD AUTO: 10.5 FL (ref 8.9–12.9)
POTASSIUM SERPL-SCNC: 4 MMOL/L (ref 3.5–5.1)
PROT SERPL-MCNC: 7.4 G/DL (ref 6.4–8.2)
RBC # BLD AUTO: 4.29 M/UL (ref 4.1–5.7)
SODIUM SERPL-SCNC: 140 MMOL/L (ref 136–145)
WBC # BLD AUTO: 6.2 K/UL (ref 4.1–11.1)

## 2018-07-25 PROCEDURE — 74177 CT ABD & PELVIS W/CONTRAST: CPT

## 2018-07-25 PROCEDURE — 74011636320 HC RX REV CODE- 636/320: Performed by: EMERGENCY MEDICINE

## 2018-07-25 PROCEDURE — 74019 RADEX ABDOMEN 2 VIEWS: CPT

## 2018-07-25 PROCEDURE — 36415 COLL VENOUS BLD VENIPUNCTURE: CPT | Performed by: EMERGENCY MEDICINE

## 2018-07-25 PROCEDURE — 83690 ASSAY OF LIPASE: CPT | Performed by: EMERGENCY MEDICINE

## 2018-07-25 PROCEDURE — 99284 EMERGENCY DEPT VISIT MOD MDM: CPT

## 2018-07-25 PROCEDURE — 74011000258 HC RX REV CODE- 258: Performed by: EMERGENCY MEDICINE

## 2018-07-25 PROCEDURE — 80053 COMPREHEN METABOLIC PANEL: CPT | Performed by: EMERGENCY MEDICINE

## 2018-07-25 PROCEDURE — 85025 COMPLETE CBC W/AUTO DIFF WBC: CPT | Performed by: EMERGENCY MEDICINE

## 2018-07-25 RX ORDER — SODIUM CHLORIDE 9 MG/ML
50 INJECTION, SOLUTION INTRAVENOUS
Status: COMPLETED | OUTPATIENT
Start: 2018-07-25 | End: 2018-07-25

## 2018-07-25 RX ORDER — FAMOTIDINE 20 MG/1
20 TABLET, FILM COATED ORAL 2 TIMES DAILY
Qty: 20 TAB | Refills: 0 | Status: SHIPPED | OUTPATIENT
Start: 2018-07-25 | End: 2018-08-04

## 2018-07-25 RX ORDER — SODIUM CHLORIDE 0.9 % (FLUSH) 0.9 %
10 SYRINGE (ML) INJECTION
Status: COMPLETED | OUTPATIENT
Start: 2018-07-25 | End: 2018-07-25

## 2018-07-25 RX ADMIN — IOPAMIDOL 100 ML: 755 INJECTION, SOLUTION INTRAVENOUS at 13:20

## 2018-07-25 RX ADMIN — Medication 10 ML: at 13:20

## 2018-07-25 RX ADMIN — SODIUM CHLORIDE 50 ML/HR: 900 INJECTION, SOLUTION INTRAVENOUS at 13:20

## 2018-07-25 NOTE — DISCHARGE INSTRUCTIONS

## 2018-07-25 NOTE — ED TRIAGE NOTES
Triage: mid upper abdominal pain, x1 month. 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 Pt states pain comes and goes. Last BM this am , soft and hard.

## 2018-07-25 NOTE — ED PROVIDER NOTES
HPI Comments: 53-year-old male presents emergency room for left-sided abdominal pain. Pain has been intermittent for one month. He is belly button. It does not radiate. Pain is described as sharp when it is present. The pain lasts for approximately one hour and then resolves. He has not taken anything for the pain. Pain seems to occur as often when he is at work. Patient works as a . No particular foods that worsen the pain. No movements. No injury or trauma to the abdomen. Patient reports intermittent nausea but no vomiting. He reports constipation. No dysuria frequency or urgency. No blood in stool. No blood in the urine. No testicular pain. No known precipitating event. Pt pain free. Social hx 
+smoker No alcohol Patient is a 23 y.o. male presenting with abdominal pain. The history is provided by the patient. Abdominal Pain Associated symptoms include nausea and constipation. Pertinent negatives include no fever, no diarrhea, no vomiting, no dysuria, no frequency, no hematuria, no headaches, no arthralgias, no myalgias, no chest pain, no testicular pain and no back pain. Past Medical History:  
Diagnosis Date  Asthma Past Surgical History:  
Procedure Laterality Date  HX ORTHOPAEDIC    
 left knee surgery  HX TONSILLECTOMY  2005? History reviewed. No pertinent family history. Social History Social History  Marital status: SINGLE Spouse name: N/A  
 Number of children: N/A  
 Years of education: N/A Occupational History  Not on file. Social History Main Topics  Smoking status: Current Some Day Smoker  Smokeless tobacco: Never Used  Alcohol use No  
 Drug use: Not on file  Sexual activity: Yes  
  Partners: Female Birth control/ protection: None Comment: partner uses birth control Other Topics Concern  Not on file Social History Narrative ** Merged History Encounter ** ALLERGIES: Grape; Milk; Nickel; and Wheat Review of Systems Constitutional: Negative for chills and fever. HENT: Negative for congestion and sore throat. Respiratory: Negative for chest tightness, shortness of breath and wheezing. Cardiovascular: Negative for chest pain and palpitations. Gastrointestinal: Positive for abdominal pain, constipation and nausea. Negative for diarrhea, rectal pain and vomiting. Genitourinary: Negative for difficulty urinating, discharge, dysuria, frequency, hematuria, scrotal swelling and testicular pain. Musculoskeletal: Negative for arthralgias, back pain, myalgias, neck pain and neck stiffness. Skin: Negative for rash and wound. Neurological: Negative for headaches. All other systems reviewed and are negative. Vitals:  
 07/25/18 1153 BP: 137/81 Pulse: 84 Resp: 16 Temp: 98.1 °F (36.7 °C) SpO2: 98% Weight: 76.8 kg (169 lb 5 oz) Physical Exam  
Constitutional: He is oriented to person, place, and time. He appears well-developed and well-nourished. No distress. HENT:  
Head: Normocephalic and atraumatic. Right Ear: External ear normal.  
Left Ear: External ear normal.  
Eyes: Conjunctivae and EOM are normal. Pupils are equal, round, and reactive to light. Neck: Normal range of motion. Neck supple. Cardiovascular: Normal rate, regular rhythm and normal heart sounds. Pulmonary/Chest: Effort normal and breath sounds normal. No respiratory distress. He has no wheezes. Abdominal: Soft. Normal appearance and bowel sounds are normal. He exhibits no shifting dullness, no distension, no pulsatile liver, no abdominal bruit, no pulsatile midline mass and no mass. There is no hepatosplenomegaly, splenomegaly or hepatomegaly. There is tenderness. There is no rigidity, no rebound, no guarding, no CVA tenderness, no tenderness at McBurney's point and negative Lua's sign. Abdomen exposed for exam. Soft, no peritoneal signs.  
Pt tender to left of umbilicus. Musculoskeletal: Normal range of motion. He exhibits no edema or tenderness. Neurological: He is alert and oriented to person, place, and time. He has normal reflexes. No cranial nerve deficit. Coordination normal.  
Skin: Skin is warm and dry. No rash noted. No erythema. Psychiatric: He has a normal mood and affect. His behavior is normal. Judgment and thought content normal.  
Nursing note and vitals reviewed. MDM Number of Diagnoses or Management Options Abdominal pain, generalized:  
Diagnosis management comments: 24-year-old male presenting for left-sided abdominal pain. His abdomen is soft with no peritoneal signs. He is currently pain-free. There is very minimal pain to palpation of the abdomen is soft the belly button on the left side. No peritoneal signs. He is afebrile. He is nontoxic appearing. Constipation, gas, ileus, mass, diverticular disease Plan: X-ray, labs 2:15 PM 
Pt remains pain free. Patient is well hydrated, well appearing, and in no respiratory distress. Physical exam is reassuring, and without signs of serious illness. Given the patient's history, clinical course, physical exam, and imaging findings, abdominal pain is unlikely secondary to a surgical etiology. Patient will be discharged home with pain control and follow-up with primary care physician in one to two days. Patient and caregivers were instructed on signs and symptoms of reasons to return including fever, worsening pain, vomiting, blood in the stool or any other concerns. Patient's results have been reviewed with them. Patient and/or family have verbally conveyed their understanding and agreement of the patient's signs, symptoms, diagnosis, treatment and prognosis and additionally agree to follow up as recommended or return to the Emergency Room should their condition change prior to follow-up.   Discharge instructions have also been provided to the patient with some educational information regarding their diagnosis as well a list of reasons why they would want to return to the ER prior to their follow-up appointment should their condition change. Patient Progress Patient progress: stable ED Course Procedures Pt case including HPI, PE, and all available lab and radiology results has been discussed with attending physician. Opportunity to evaluate patient has been provided to ER attending. Discharge and prescription plan has been agreed upon.

## 2018-09-18 ENCOUNTER — OFFICE VISIT (OUTPATIENT)
Dept: URGENT CARE | Age: 23
End: 2018-09-18

## 2018-09-18 VITALS
HEIGHT: 76 IN | RESPIRATION RATE: 16 BRPM | OXYGEN SATURATION: 99 % | HEART RATE: 78 BPM | DIASTOLIC BLOOD PRESSURE: 78 MMHG | WEIGHT: 172 LBS | TEMPERATURE: 97 F | SYSTOLIC BLOOD PRESSURE: 119 MMHG | BODY MASS INDEX: 20.95 KG/M2

## 2018-09-18 DIAGNOSIS — R06.2 WHEEZING ON AUSCULTATION: ICD-10-CM

## 2018-09-18 DIAGNOSIS — J02.9 SORE THROAT: ICD-10-CM

## 2018-09-18 DIAGNOSIS — R05.9 COUGH: ICD-10-CM

## 2018-09-18 DIAGNOSIS — J06.9 UPPER RESPIRATORY TRACT INFECTION, UNSPECIFIED TYPE: Primary | ICD-10-CM

## 2018-09-18 LAB
S PYO AG THROAT QL: NEGATIVE
VALID INTERNAL CONTROL?: YES

## 2018-09-18 RX ORDER — ALBUTEROL SULFATE 90 UG/1
2 AEROSOL, METERED RESPIRATORY (INHALATION)
Qty: 1 INHALER | Refills: 0 | Status: SHIPPED | OUTPATIENT
Start: 2018-09-18 | End: 2018-09-18

## 2018-09-18 RX ORDER — PREDNISONE 5 MG/1
TABLET ORAL
Qty: 21 TAB | Refills: 0 | Status: SHIPPED | OUTPATIENT
Start: 2018-09-18 | End: 2018-09-18

## 2018-09-18 RX ORDER — PREDNISONE 5 MG/1
TABLET ORAL
Qty: 21 TAB | Refills: 0 | Status: SHIPPED | OUTPATIENT
Start: 2018-09-18 | End: 2021-03-02 | Stop reason: CLARIF

## 2018-09-18 RX ORDER — ALBUTEROL SULFATE 90 UG/1
2 AEROSOL, METERED RESPIRATORY (INHALATION)
Qty: 1 INHALER | Refills: 0 | Status: SHIPPED | OUTPATIENT
Start: 2018-09-18 | End: 2021-03-02 | Stop reason: CLARIF

## 2018-09-18 NOTE — PATIENT INSTRUCTIONS
Wheezing or Bronchoconstriction: Care Instructions  Your Care Instructions  Wheezing is a whistling noise made during breathing. It occurs when the small airways, or bronchial tubes, that lead to your lungs swell or contract (spasm) and become narrow. This narrowing is called bronchoconstriction. When your airways constrict, it is hard for air to pass through and this makes it hard for you to breathe. Wheezing and bronchoconstriction can be caused by many problems, including:  · An infection such as the flu or a cold. · Allergies such as hay fever. · Diseases such as asthma or chronic obstructive pulmonary disease. · Smoking. Treatment for your wheezing depends on what is causing the problem. Your wheezing may get better without treatment. But you may need to pay attention to things that cause your wheezing and avoid them. Or you may need medicine to help treat the wheezing and to reduce the swelling or to relieve spasms in your lungs. Follow-up care is a key part of your treatment and safety. Be sure to make and go to all appointments, and call your doctor if you are having problems. It is also a good idea to know your test results and keep a list of the medicines you take. How can you care for yourself at home? · Take your medicine exactly as prescribed. Call your doctor if you think you are having a problem with your medicine. You will get more details on the specific medicine your doctor prescribes. · If your doctor prescribed antibiotics, take them as directed. Do not stop taking them just because you feel better. You need to take the full course of antibiotics. · Breathe moist air from a humidifier, hot shower, or sink filled with hot water. This may help ease your symptoms and make it easier for you to breathe. · If you have congestion in your nose and throat, drinking plenty of fluids, especially hot fluids, may help relieve your symptoms.  If you have kidney, heart, or liver disease and have to limit fluids, talk with your doctor before you increase the amount of fluids you drink. · If you have mucus in your airways, it may help to breathe deeply and cough. · Do not smoke or allow others to smoke around you. Smoking can make your wheezing worse. If you need help quitting, talk to your doctor about stop-smoking programs and medicines. These can increase your chances of quitting for good. · Avoid things that may cause your wheezing. These may include colds, smoke, air pollution, dust, pollen, pets, cockroaches, stress, and cold air. When should you call for help? Call 911 anytime you think you may need emergency care. For example, call if:    · You have severe trouble breathing.     · You passed out (lost consciousness).    Call your doctor now or seek immediate medical care if:    · You cough up yellow, dark brown, or bloody mucus (sputum).     · You have new or worse shortness of breath.     · Your wheezing is not getting better or it gets worse after you start taking your medicine.    Watch closely for changes in your health, and be sure to contact your doctor if:    · You do not get better as expected. Where can you learn more? Go to http://kala-maura.info/. Enter 454 9919 in the search box to learn more about \"Wheezing or Bronchoconstriction: Care Instructions. \"  Current as of: December 6, 2017  Content Version: 11.7  © 1971-4560 [employer]. Care instructions adapted under license by [employer] (which disclaims liability or warranty for this information). If you have questions about a medical condition or this instruction, always ask your healthcare professional. [place] [address] any warranty or liability for your use of this information. Upper Respiratory Infection (Cold): Care Instructions  Your Care Instructions    An upper respiratory infection, or URI, is an infection of the nose, sinuses, or throat.  URIs are spread by coughs, sneezes, and direct contact. The common cold is the most frequent kind of URI. The flu and sinus infections are other kinds of URIs. Almost all URIs are caused by viruses. Antibiotics won't cure them. But you can treat most infections with home care. This may include drinking lots of fluids and taking over-the-counter pain medicine. You will probably feel better in 4 to 10 days. The doctor has checked you carefully, but problems can develop later. If you notice any problems or new symptoms, get medical treatment right away. Follow-up care is a key part of your treatment and safety. Be sure to make and go to all appointments, and call your doctor if you are having problems. It's also a good idea to know your test results and keep a list of the medicines you take. How can you care for yourself at home? · To prevent dehydration, drink plenty of fluids, enough so that your urine is light yellow or clear like water. Choose water and other caffeine-free clear liquids until you feel better. If you have kidney, heart, or liver disease and have to limit fluids, talk with your doctor before you increase the amount of fluids you drink. · Take an over-the-counter pain medicine, such as acetaminophen (Tylenol), ibuprofen (Advil, Motrin), or naproxen (Aleve). Read and follow all instructions on the label. · Before you use cough and cold medicines, check the label. These medicines may not be safe for young children or for people with certain health problems. · Be careful when taking over-the-counter cold or flu medicines and Tylenol at the same time. Many of these medicines have acetaminophen, which is Tylenol. Read the labels to make sure that you are not taking more than the recommended dose. Too much acetaminophen (Tylenol) can be harmful. · Get plenty of rest.  · Do not smoke or allow others to smoke around you. If you need help quitting, talk to your doctor about stop-smoking programs and medicines.  These can increase your chances of quitting for good. When should you call for help? Call 911 anytime you think you may need emergency care. For example, call if:    · You have severe trouble breathing.    Call your doctor now or seek immediate medical care if:    · You seem to be getting much sicker.     · You have new or worse trouble breathing.     · You have a new or higher fever.     · You have a new rash.    Watch closely for changes in your health, and be sure to contact your doctor if:    · You have a new symptom, such as a sore throat, an earache, or sinus pain.     · You cough more deeply or more often, especially if you notice more mucus or a change in the color of your mucus.     · You do not get better as expected. Where can you learn more? Go to http://kala-maura.info/. Enter X682 in the search box to learn more about \"Upper Respiratory Infection (Cold): Care Instructions. \"  Current as of: December 6, 2017  Content Version: 11.7  © 7132-8683 [employer]. Care instructions adapted under license by [employer] (which disclaims liability or warranty for this information). If you have questions about a medical condition or this instruction, always ask your healthcare professional. [address] any warranty or liability for your use of this information.

## 2018-09-18 NOTE — PROGRESS NOTES
Patient is a 26 y.o. male presenting with cough. Cough   The history is provided by the patient. This is a new problem. The current episode started 2 days ago. The problem occurs every few minutes. The problem has been gradually worsening. The cough is non-productive. There has been no fever. Associated symptoms include rhinorrhea, sore throat and wheezing. Pertinent negatives include no chest pain, no chills, no sweats, no ear congestion, no ear pain, no myalgias and no shortness of breath. He has tried nothing for the symptoms. He is a smoker. His past medical history is significant for asthma. Past Medical History:   Diagnosis Date    Asthma         Past Surgical History:   Procedure Laterality Date    HX ORTHOPAEDIC      left knee surgery    HX TONSILLECTOMY  2005? History reviewed. No pertinent family history. Social History     Social History    Marital status: SINGLE     Spouse name: N/A    Number of children: N/A    Years of education: N/A     Occupational History    Not on file. Social History Main Topics    Smoking status: Current Some Day Smoker    Smokeless tobacco: Never Used    Alcohol use No    Drug use: Not on file    Sexual activity: Yes     Partners: Female     Birth control/ protection: None      Comment: partner uses birth control     Other Topics Concern    Not on file     Social History Narrative    ** Merged History Encounter **                     ALLERGIES: Grape; Milk; Nickel; and Wheat    Review of Systems   Constitutional: Negative for chills and fever. HENT: Positive for congestion, rhinorrhea and sore throat. Negative for ear pain. Respiratory: Positive for cough and wheezing. Negative for shortness of breath. Cardiovascular: Negative for chest pain and palpitations. Musculoskeletal: Negative for myalgias. Skin: Negative for rash. Hematological: Negative for adenopathy.        Vitals:    09/18/18 0956   BP: 119/78   Pulse: 78   Resp: 16 Temp: 97 °F (36.1 °C)   SpO2: 99%   Weight: 172 lb (78 kg)   Height: 6' 4\" (1.93 m)       Physical Exam   Constitutional: He appears well-developed and well-nourished. No distress. HENT:   Right Ear: Tympanic membrane, external ear and ear canal normal.   Left Ear: Tympanic membrane, external ear and ear canal normal.   Nose: Nose normal. Right sinus exhibits no maxillary sinus tenderness and no frontal sinus tenderness. Left sinus exhibits no maxillary sinus tenderness and no frontal sinus tenderness. Mouth/Throat: Mucous membranes are normal. Posterior oropharyngeal erythema present. No oropharyngeal exudate, posterior oropharyngeal edema or tonsillar abscesses. Cardiovascular: Normal rate, regular rhythm and normal heart sounds. Pulmonary/Chest: Effort normal. No respiratory distress. He has wheezes in the right upper field, the right lower field, the left upper field and the left lower field. He has no rales. Lymphadenopathy:     He has no cervical adenopathy. Neurological: He is alert. Skin: He is not diaphoretic. Psychiatric: He has a normal mood and affect. His behavior is normal. Judgment and thought content normal.   Nursing note and vitals reviewed. MDM    ICD-10-CM ICD-9-CM    1. Upper respiratory tract infection, unspecified type J06.9 465.9    2. Sore throat J02.9 462 AMB POC RAPID STREP A   3. Cough R05 786.2    4. Wheezing on auscultation R06.2 786.07      Medications Ordered Today   Medications    predniSONE (STERAPRED) 5 mg dose pack     Sig: See administration instruction per 5mg dose pack     Dispense:  21 Tab     Refill:  0    albuterol (PROVENTIL HFA, VENTOLIN HFA, PROAIR HFA) 90 mcg/actuation inhaler     Sig: Take 2 Puffs by inhalation every four (4) hours as needed for Wheezing. Dispense:  1 Inhaler     Refill:  0     The patients condition was discussed with the patient and they understand. The patient is to follow up with PCP [facility].  If signs and symptoms become worse the pt is to go to the ER. The patient is to take medications as prescribed.      Results for orders placed or performed in visit on 09/18/18   AMB POC RAPID STREP A   Result Value Ref Range    VALID INTERNAL CONTROL POC Yes     Group A Strep Ag Negative Negative           Procedures

## 2018-09-18 NOTE — MR AVS SNAPSHOT
[address] [place] [address] 
[phone] Patient: Augusta Sandhu MRN: [id number] HXF:[birth year] Visit Information Date & Time Provider Department Dept. Phone Encounter #  
 9/18/2018 10:00 AM [address] [facility] [phone] [id number] Upcoming Health Maintenance Date Due  
 HPV Age 9Y-34Y (3 of 1 - Male 3 Dose Series) 8/30/2008 Pneumococcal 19-64 Medium Risk (1 of 1 - PPSV23) 8/30/2016 Influenza Age 5 to Adult 8/1/2018 DTaP/Tdap/Td series (1 - Tdap) 8/30/2018 Allergies as of 9/18/2018  Review Complete On: 9/18/2018 By: Candi Dakins, MD  
  
 Severity Noted Reaction Type Reactions Grape  10/15/2015    Other (comments) Per allergy testing Milk  10/15/2015    Other (comments) Per allergy testing Nickel  10/07/2015    Rash Wheat  10/15/2015    Other (comments) Per allergy testing Current Immunizations  Never Reviewed No immunizations on file. Not reviewed this visit You Were Diagnosed With   
  
 Codes Comments Upper respiratory tract infection, unspecified type    -  Primary ICD-10-CM: J06.9 ICD-9-CM: 465.9 Sore throat     ICD-10-CM: J02.9 ICD-9-CM: 496 Cough     ICD-10-CM: R05 ICD-9-CM: 786.2 Wheezing on auscultation     ICD-10-CM: R06.2 ICD-9-CM: 786.07 Vitals BP Pulse Temp Resp Height(growth percentile) Weight(growth percentile) 119/78 78 97 °F (36.1 °C) 16 6' 4\" (1.93 m) 172 lb (78 kg) SpO2 BMI Smoking Status 99% 20.94 kg/m2 Current Some Day Smoker BMI and BSA Data Body Mass Index Body Surface Area  
 20.94 kg/m 2 2.05 m 2 Preferred Pharmacy Pharmacy Name Phone [id number] [place] [address], [address] [phone] Your Updated Medication List  
  
   
 This list is accurate as of 9/18/18 10:12 AM.  Always use your most recent med list.  
  
  
  
  
 * albuterol sulfate 2.5 mg/0.5 mL Nebu nebulizer solution Commonly known as:  PROVENTIL;VENTOLIN  
by Nebulization route once. * albuterol 90 mcg/actuation inhaler Commonly known as:  PROVENTIL HFA, VENTOLIN HFA, PROAIR HFA Take  by inhalation. * albuterol 90 mcg/actuation inhaler Commonly known as:  PROVENTIL HFA, VENTOLIN HFA, PROAIR HFA Take 2 Puffs by inhalation every four (4) hours as needed for Wheezing. Cetirizine 10 mg Cap Commonly known as:  ZyrTEC Take 10 mg by mouth daily. EPINEPHrine 0.3 mg/0.3 mL injection Commonly known as:  AUVI-Q  
0.3 mL by IntraMUSCular route once as needed for up to 1 dose. predniSONE 5 mg dose pack Commonly known as:  STERAPRED See administration instruction per 5mg dose pack  
  
 triamcinolone acetonide 0.1 % ointment Commonly known as:  KENALOG Apply  to affected area two (2) times a day. use thin layer to affected area * Notice: This list has 3 medication(s) that are the same as other medications prescribed for you. Read the directions carefully, and ask your doctor or other care provider to review them with you. Prescriptions Sent to Pharmacy Refills  
 predniSONE (STERAPRED) 5 mg dose pack 0 Sig: See administration instruction per 5mg dose pack Class: Normal  
 Pharmacy[id number] [address] [facility]: [phone]  
 albuterol (PROVENTIL HFA, VENTOLIN HFA, PROAIR HFA) 90 mcg/actuation inhaler 0 Sig: Take 2 Puffs by inhalation every four (4) hours as needed for Wheezing. Class: Normal  
 Pharmacy: [facility] Ph #: [phone] Route: Inhalation We Performed the Following AMB POC RAPID STREP A [58930 CPT(R)] Patient Instructions Wheezing or Bronchoconstriction: Care Instructions Your Care Instructions Wheezing is a whistling noise made during breathing. It occurs when the small airways, or bronchial tubes, that lead to your lungs swell or contract (spasm) and become narrow. This narrowing is called bronchoconstriction. When your airways constrict, it is hard for air to pass through and this makes it hard for you to breathe. Wheezing and bronchoconstriction can be caused by many problems, including: · An infection such as the flu or a cold. · Allergies such as hay fever. · Diseases such as asthma or chronic obstructive pulmonary disease. · Smoking. Treatment for your wheezing depends on what is causing the problem. Your wheezing may get better without treatment. But you may need to pay attention to things that cause your wheezing and avoid them. Or you may need medicine to help treat the wheezing and to reduce the swelling or to relieve spasms in your lungs. Follow-up care is a key part of your treatment and safety. Be sure to make and go to all appointments, and call your doctor if you are having problems. It is also a good idea to know your test results and keep a list of the medicines you take. How can you care for yourself at home? · Take your medicine exactly as prescribed. Call your doctor if you think you are having a problem with your medicine. You will get more details on the specific medicine your doctor prescribes. · If your doctor prescribed antibiotics, take them as directed. Do not stop taking them just because you feel better. You need to take the full course of antibiotics. · Breathe moist air from a humidifier, hot shower, or sink filled with hot water. This may help ease your symptoms and make it easier for you to breathe. · If you have congestion in your nose and throat, drinking plenty of fluids, especially hot fluids, may help relieve your symptoms.  If you have kidney, heart, or liver disease and have to limit fluids, talk with your doctor before you increase the amount of fluids you drink. · If you have mucus in your airways, it may help to breathe deeply and cough. · Do not smoke or allow others to smoke around you. Smoking can make your wheezing worse. If you need help quitting, talk to your doctor about stop-smoking programs and medicines. These can increase your chances of quitting for good. · Avoid things that may cause your wheezing. These may include colds, smoke, air pollution, dust, pollen, pets, cockroaches, stress, and cold air. When should you call for help? Call 911 anytime you think you may need emergency care. For example, call if: 
  · You have severe trouble breathing.  
  · You passed out (lost consciousness).  
 Call your doctor now or seek immediate medical care if: 
  · You cough up yellow, dark brown, or bloody mucus (sputum).  
  · You have new or worse shortness of breath.  
  · Your wheezing is not getting better or it gets worse after you start taking your medicine.  
 Watch closely for changes in your health, and be sure to contact your doctor if: 
  · You do not get better as expected. Where can you learn more? Go to http://kala-maura.info/. Enter 454 7408 in the search box to learn more about \"Wheezing or Bronchoconstriction: Care Instructions. \" Current as of: December 6, 2017 Content Version: 11.7 © 3764-0614 [employer]. Care instructions adapted under license by [employer] (which disclaims liability or warranty for this information). If you have questions about a medical condition or this instruction, always ask your healthcare professional. [address] any warranty or liability for your use of this information. Upper Respiratory Infection (Cold): Care Instructions Your Care Instructions An upper respiratory infection, or URI, is an infection of the nose, sinuses, or throat. URIs are spread by coughs, sneezes, and direct contact. The common cold is the most frequent kind of URI. The flu and sinus infections are other kinds of URIs. Almost all URIs are caused by viruses. Antibiotics won't cure them. But you can treat most infections with home care. This may include drinking lots of fluids and taking over-the-counter pain medicine. You will probably feel better in 4 to 10 days. The doctor has checked you carefully, but problems can develop later. If you notice any problems or new symptoms, get medical treatment right away. Follow-up care is a key part of your treatment and safety. Be sure to make and go to all appointments, and call your doctor if you are having problems. It's also a good idea to know your test results and keep a list of the medicines you take. How can you care for yourself at home? · To prevent dehydration, drink plenty of fluids, enough so that your urine is light yellow or clear like water. Choose water and other caffeine-free clear liquids until you feel better. If you have kidney, heart, or liver disease and have to limit fluids, talk with your doctor before you increase the amount of fluids you drink. · Take an over-the-counter pain medicine, such as acetaminophen (Tylenol), ibuprofen (Advil, Motrin), or naproxen (Aleve). Read and follow all instructions on the label. · Before you use cough and cold medicines, check the label. These medicines may not be safe for young children or for people with certain health problems. · Be careful when taking over-the-counter cold or flu medicines and Tylenol at the same time. Many of these medicines have acetaminophen, which is Tylenol. Read the labels to make sure that you are not taking more than the recommended dose. Too much acetaminophen (Tylenol) can be harmful. · Get plenty of rest. 
· Do not smoke or allow others to smoke around you.  If you need help quitting, talk to your doctor about stop-smoking programs and medicines. These can increase your chances of quitting for good. When should you call for help? Call 911 anytime you think you may need emergency care. For example, call if: 
  · You have severe trouble breathing.  
 Call your doctor now or seek immediate medical care if: 
  · You seem to be getting much sicker.  
  · You have new or worse trouble breathing.  
  · You have a new or higher fever.  
  · You have a new rash.  
 Watch closely for changes in your health, and be sure to contact your doctor if: 
  · You have a new symptom, such as a sore throat, an earache, or sinus pain.  
  · You cough more deeply or more often, especially if you notice more mucus or a change in the color of your mucus.  
  · You do not get better as expected. Where can you learn more? Go to http://kala-maura.info/. Enter U632 in the search box to learn more about \"Upper Respiratory Infection (Cold): Care Instructions. \" Current as of: December 6, 2017 Content Version: 11.7 © 8163-1344 [employer]. Care instructions adapted under license by [employer] (which disclaims liability or warranty for this information). If you have questions about a medical condition or this instruction, always ask your healthcare professional. [address] any warranty or liability for your use of this information. Introducing [facility]! [employer] introduces IncellDx patient portal. Now you can access parts of your medical record, email your doctor's office, and request medication refills online. 1. In your internet browser, go to [email or web address] 2. Click on the First Time User? Click Here link in the Sign In box. You will see the New Member Sign Up page. 3. Enter your IncellDx Access Code exactly as it appears below.  You will not need to use this code after youve completed the sign-up process. If you do not sign up before the expiration date, you must request a new code. · ProsperWorks Access Code: [email or web address] Expires: 10/23/2018 11:53 AM 
 
4. Enter the last four digits of your Social Security Number (xxxx) and Date of Birth (mm/dd/yyyy) as indicated and click Submit. You will be taken to the next sign-up page. 5. Create a ProsperWorks ID. This will be your ProsperWorks login ID and cannot be changed, so think of one that is secure and easy to remember. 6. Create a ProsperWorks password. You can change your password at any time. 7. Enter your Password Reset Question and Answer. This can be used at a later time if you forget your password. 8. Enter your e-mail address. You will receive e-mail notification when new information is available in [address]. 9. Click Sign Up. You can now view and download portions of your medical record. 10. Click the Download Summary menu link to download a portable copy of your medical information. If you have questions, please visit the Frequently Asked Questions section of the ProsperWorks website. Remember, ProsperWorks is NOT to be used for urgent needs. For medical emergencies, dial 911. Now available from your iPhone and Android! Please provide this summary of care documentation to your next provider. Your primary care clinician is listed as NONE. If you have any questions after today's visit, please call [phone].

## 2020-11-29 ENCOUNTER — APPOINTMENT (OUTPATIENT)
Dept: GENERAL RADIOLOGY | Age: 25
End: 2020-11-29
Attending: EMERGENCY MEDICINE

## 2020-11-29 ENCOUNTER — HOSPITAL ENCOUNTER (EMERGENCY)
Age: 25
Discharge: HOME | End: 2020-11-29
Attending: EMERGENCY MEDICINE

## 2020-11-29 VITALS
HEART RATE: 81 BPM | RESPIRATION RATE: 18 BRPM | OXYGEN SATURATION: 100 % | TEMPERATURE: 98.1 F | SYSTOLIC BLOOD PRESSURE: 145 MMHG | DIASTOLIC BLOOD PRESSURE: 77 MMHG

## 2020-11-29 DIAGNOSIS — R07.9 ACUTE CHEST PAIN: Primary | ICD-10-CM

## 2020-11-29 LAB — TROPONIN I SERPL-MCNC: <0.05 NG/ML

## 2020-11-29 PROCEDURE — 71046 X-RAY EXAM CHEST 2 VIEWS: CPT

## 2020-11-29 PROCEDURE — 99283 EMERGENCY DEPT VISIT LOW MDM: CPT

## 2020-11-29 PROCEDURE — 93005 ELECTROCARDIOGRAM TRACING: CPT

## 2020-11-29 PROCEDURE — 36415 COLL VENOUS BLD VENIPUNCTURE: CPT

## 2020-11-29 PROCEDURE — 84484 ASSAY OF TROPONIN QUANT: CPT

## 2020-11-29 NOTE — ED TRIAGE NOTES
Patient has left chest wall pain without radiation or shortness of breath. Pain began while eating brunch today. Similar episode 2 weeks ago but it resolved. No fever or other symptoms.

## 2020-11-29 NOTE — DISCHARGE INSTRUCTIONS

## 2020-11-29 NOTE — ED PROVIDER NOTES
HPI     Please note that this dictation was completed with [employer], the computer voice recognition software. Quite often unanticipated grammatical, syntax, homophones, and other interpretive errors are inadvertently transcribed by the computer software. Please disregard these errors. Please excuse any errors that have escaped final proofreading. 21-year-old male with a history of asthma here with chest pain intermittently for 1 month. Patient has had intermittent left-sided chest pain occurring every few days to once a week lasting for 5 minutes. Today, patient had the onset at 10:30 AM of 30 minutes of left-sided chest pain worse with left arm movement. It was 7 out of 10 and constant. It was associated with some sweating without nausea, vomiting, shortness of breath. No meds prior to arrival.  He is currently chest pain-free. Denies any leg pain or swelling, fevers, cough, difficulty breathing or other complaints. Social history: Positive alcohol. Positive marijuana. Occasional tobacco use. Past Medical History:   Diagnosis Date    Asthma        Past Surgical History:   Procedure Laterality Date    HX ORTHOPAEDIC      left knee surgery    HX TONSILLECTOMY  2005? History reviewed. No pertinent family history.     Social History     Socioeconomic History    Marital status: SINGLE     Spouse name: Not on file    Number of children: Not on file    Years of education: Not on file    Highest education level: Not on file   Occupational History    Not on file   Social Needs    Financial resource strain: Not on file    Food insecurity     Worry: Not on file     Inability: Not on file    Transportation needs     Medical: Not on file     Non-medical: Not on file   Tobacco Use    Smoking status: Current Some Day Smoker    Smokeless tobacco: Never Used   Substance and Sexual Activity    Alcohol use: No    Drug use: Not on file    Sexual activity: Yes     Partners: Female     Birth control/protection: None     Comment: partner uses birth control   Lifestyle    Physical activity     Days per week: Not on file     Minutes per session: Not on file    Stress: Not on file   Relationships    Social connections     Talks on phone: Not on file     Gets together: Not on file     Attends Oriental orthodox service: Not on file     Active member of club or organization: Not on file     Attends meetings of clubs or organizations: Not on file     Relationship status: Not on file    Intimate partner violence     Fear of current or ex partner: Not on file     Emotionally abused: Not on file     Physically abused: Not on file     Forced sexual activity: Not on file   Other Topics Concern    Not on file   Social History Narrative    ** Merged History Encounter **              ALLERGIES: Grape; Milk; Nickel; and Wheat    Review of Systems   Constitutional: Positive for diaphoresis. Respiratory: Negative for cough and shortness of breath. Cardiovascular: Positive for chest pain. Negative for leg swelling. All other systems reviewed and are negative. Vitals:    11/29/20 1213   BP: (!) 145/77   Pulse: 81   Resp: 18   Temp: 98.1 °F (36.7 °C)   SpO2: 100%            Physical Exam     Nursing note and vitals reviewed. Constitutional: appears well-developed and well-nourished. No distress. HENT:   Head: Normocephalic and atraumatic. Sclera anicteric  Nose: No rhinorrhea  Mouth/Throat: Oropharynx is clear and moist. Pharynx normal  Eyes: Conjunctivae are normal.Right eye exhibits no discharge. Left eye exhibits no discharge. No scleral icterus. Neck: Painless normal range of motion. Supple  Cardiovascular: Normal rate, regular rhythm, normal heart sounds and intact distal pulses. Exam reveals no gallop and no friction rub. No murmur heard. NONTENDER CHEST. Pulmonary/Chest: Effort normal and breath sounds normal. No respiratory distress. no wheezes. no rales. Abdominal: Soft.   Exhibits no distension and no mass. No tenderness. No guarding. Musculoskeletal: Normal range of motion. no tenderness. No edema  Lymphadenopathy:   No cervical adenopathy. Neurological:  Alert and oriented to person, place, and time. Moving all extremities. Skin: Skin is warm and dry. No rash noted. No pallor. MDM     72-year-old male here with left-sided chest pain now resolved. Is been intermittently occurring for 1 month. EKG is nonischemic. He does appear to maybe have LVH on EKG. Blood pressure mildly elevated. Will check troponin, chest x-ray. Patient will need close follow-up with primary care doctor if work-up is negative. Procedures             ED EKG interpretation:  Rhythm: normal sinus rhythm; and regular . Rate (approx.): 67; Axis: normal; P wave: normal; QRS interval: normal ; ST/T wave: normal; BORDERLINE LVH BY VOLTAGE. This EKG was interpreted by Bennett Lennox, MD,ED Provider. 2:41 PM  Patient's results have been reviewed with them. Patient and/or family have verbally conveyed their understanding and agreement of the patient's signs, symptoms, diagnosis, treatment and prognosis and additionally agree to follow up as recommended or return to the Emergency Room should their condition change prior to follow-up. Discharge instructions have also been provided to the patient with some educational information regarding their diagnosis as well a list of reasons why they would want to return to the ER prior to their follow-up appointment should their condition change.     Recent Results (from the past 24 hour(s))   EKG, 12 LEAD, INITIAL    Collection Time: 11/29/20 12:27 PM   Result Value Ref Range    Ventricular Rate 67 BPM    Atrial Rate 67 BPM    P-R Interval 156 ms    QRS Duration 88 ms    Q-T Interval 380 ms    QTC Calculation (Bezet) 401 ms    Calculated P Axis 55 degrees    Calculated R Axis 85 degrees    Calculated T Axis 65 degrees    Diagnosis Normal sinus rhythm  No previous ECGs available      TROPONIN I    Collection Time: 11/29/20  1:31 PM   Result Value Ref Range    Troponin-I, Qt. <0.05 <0.05 ng/mL       Xr Chest Pa Lat    Result Date: 11/29/2020  EXAM: XR CHEST PA LAT INDICATION: Chest Pain COMPARISON: CT thorax 12/2/2009. Chest x-ray 10/27/2009. FINDINGS: PA and lateral radiographs of the chest demonstrate clear lungs. The cardiac and mediastinal contours and pulmonary vascularity are normal. The bones and soft tissues are within normal limits.      IMPRESSION: Normal.

## 2020-11-29 NOTE — ED NOTES
Pt discharged home with parent/guardian. Pt acting age appropriately, respirations regular and unlabored, cap refill less than two seconds. Parent/guardian verbalized understanding of discharge paperwork and has no further questions at this time. Patient given discharge instructions. Patient ambulatory out of the department. Education:  Patient encouraged to return to ED for continued or worsening symptoms. Reviewed lab, xray, and EKG results with patient prior to discharge. Reassessment:  Patient states decreased chest discomfort at time of discharge.

## 2020-11-29 NOTE — LETTER
[address] 
[address] [facility] DEPT 
[address]  [place] 
[phone] Work/School Note Date: 11/29/2020 To Whom It May concern: 
 
Erven Duane was seen and treated today in the emergency room by the following provider(s): 
Attending Provider: Julian Freeman MD.   
 
Erven Duane may return to work on Monday, 11/30. Please excuse from work on 11/29. Sincerely, Krish Patton RN

## 2020-11-30 LAB
ATRIAL RATE: 67 BPM
CALCULATED P AXIS, ECG09: 55 DEGREES
CALCULATED R AXIS, ECG10: 85 DEGREES
CALCULATED T AXIS, ECG11: 65 DEGREES
DIAGNOSIS, 93000: NORMAL
P-R INTERVAL, ECG05: 156 MS
Q-T INTERVAL, ECG07: 380 MS
QRS DURATION, ECG06: 88 MS
QTC CALCULATION (BEZET), ECG08: 401 MS
VENTRICULAR RATE, ECG03: 67 BPM

## 2021-03-02 ENCOUNTER — HOSPITAL ENCOUNTER (EMERGENCY)
Age: 26
Discharge: HOME | End: 2021-03-02
Attending: STUDENT IN AN ORGANIZED HEALTH CARE EDUCATION/TRAINING PROGRAM

## 2021-03-02 ENCOUNTER — APPOINTMENT (OUTPATIENT)
Dept: GENERAL RADIOLOGY | Age: 26
End: 2021-03-02
Attending: STUDENT IN AN ORGANIZED HEALTH CARE EDUCATION/TRAINING PROGRAM

## 2021-03-02 VITALS
TEMPERATURE: 98 F | BODY MASS INDEX: 22.14 KG/M2 | RESPIRATION RATE: 16 BRPM | DIASTOLIC BLOOD PRESSURE: 65 MMHG | SYSTOLIC BLOOD PRESSURE: 115 MMHG | OXYGEN SATURATION: 99 % | HEART RATE: 72 BPM | WEIGHT: 181.88 LBS

## 2021-03-02 DIAGNOSIS — R07.89 MUSCULOSKELETAL CHEST PAIN: Primary | ICD-10-CM

## 2021-03-02 LAB
ATRIAL RATE: 71 BPM
BASOPHILS # BLD: 0 K/UL (ref 0–0.1)
BASOPHILS NFR BLD: 1 % (ref 0–1)
CALCULATED P AXIS, ECG09: 62 DEGREES
CALCULATED R AXIS, ECG10: 81 DEGREES
CALCULATED T AXIS, ECG11: 64 DEGREES
COMMENT, HOLDF: NORMAL
DIAGNOSIS, 93000: NORMAL
DIFFERENTIAL METHOD BLD: NORMAL
EOSINOPHIL # BLD: 0.2 K/UL (ref 0–0.4)
EOSINOPHIL NFR BLD: 4 % (ref 0–7)
ERYTHROCYTE [DISTWIDTH] IN BLOOD BY AUTOMATED COUNT: 12.4 % (ref 11.5–14.5)
ERYTHROCYTE [SEDIMENTATION RATE] IN BLOOD: 3 MM/HR (ref 0–15)
HCT VFR BLD AUTO: 45 % (ref 36.6–50.3)
HGB BLD-MCNC: 15.4 G/DL (ref 12.1–17)
IMM GRANULOCYTES # BLD AUTO: 0 K/UL (ref 0–0.04)
IMM GRANULOCYTES NFR BLD AUTO: 0 % (ref 0–0.5)
LYMPHOCYTES # BLD: 1.4 K/UL (ref 0.8–3.5)
LYMPHOCYTES NFR BLD: 26 % (ref 12–49)
MCH RBC QN AUTO: 33 PG (ref 26–34)
MCHC RBC AUTO-ENTMCNC: 34.2 G/DL (ref 30–36.5)
MCV RBC AUTO: 96.4 FL (ref 80–99)
MONOCYTES # BLD: 0.4 K/UL (ref 0–1)
MONOCYTES NFR BLD: 7 % (ref 5–13)
NEUTS SEG # BLD: 3.5 K/UL (ref 1.8–8)
NEUTS SEG NFR BLD: 62 % (ref 32–75)
NRBC # BLD: 0 K/UL (ref 0–0.01)
NRBC BLD-RTO: 0 PER 100 WBC
P-R INTERVAL, ECG05: 140 MS
PLATELET # BLD AUTO: 201 K/UL (ref 150–400)
PMV BLD AUTO: 10.4 FL (ref 8.9–12.9)
Q-T INTERVAL, ECG07: 376 MS
QRS DURATION, ECG06: 88 MS
QTC CALCULATION (BEZET), ECG08: 408 MS
RBC # BLD AUTO: 4.67 M/UL (ref 4.1–5.7)
SAMPLES BEING HELD,HOLD: NORMAL
TROPONIN I SERPL-MCNC: <0.05 NG/ML
VENTRICULAR RATE, ECG03: 71 BPM
WBC # BLD AUTO: 5.5 K/UL (ref 4.1–11.1)

## 2021-03-02 PROCEDURE — 93005 ELECTROCARDIOGRAM TRACING: CPT

## 2021-03-02 PROCEDURE — 84484 ASSAY OF TROPONIN QUANT: CPT

## 2021-03-02 PROCEDURE — 85025 COMPLETE CBC W/AUTO DIFF WBC: CPT

## 2021-03-02 PROCEDURE — 99283 EMERGENCY DEPT VISIT LOW MDM: CPT

## 2021-03-02 PROCEDURE — 36415 COLL VENOUS BLD VENIPUNCTURE: CPT

## 2021-03-02 PROCEDURE — 71046 X-RAY EXAM CHEST 2 VIEWS: CPT

## 2021-03-02 PROCEDURE — 85652 RBC SED RATE AUTOMATED: CPT

## 2021-03-02 NOTE — ED PROVIDER NOTES
22 yo M with no significant past medical history presenting to the ED for evaluation of chest pain. Patient reports that the pain is left sided. Lasting about 20 minutes and occurs first thing in the morning and when the patient is at work. Denies heavy lifting. The discomfort usually goes away with stretching and movement of the left arm. No radiation to the back, shoulder or neck. No fevers. No cough or congestion. No vomiting. Seen 3 months ago for the same symptoms - negative work-up at the time. Reports that the symptoms improved slightly but have now returned. The history is provided by the patient. Chest Pain   Pertinent negatives include no abdominal pain, no cough, no dizziness, no fever, no headaches, no nausea, no numbness, no palpitations, no shortness of breath and no vomiting. Past Medical History:   Diagnosis Date    Asthma        Past Surgical History:   Procedure Laterality Date    HX ORTHOPAEDIC      left knee surgery    HX TONSILLECTOMY  2005? History reviewed. No pertinent family history.     Social History     Socioeconomic History    Marital status: SINGLE     Spouse name: Not on file    Number of children: Not on file    Years of education: Not on file    Highest education level: Not on file   Occupational History    Not on file   Social Needs    Financial resource strain: Not on file    Food insecurity     Worry: Not on file     Inability: Not on file    Transportation needs     Medical: Not on file     Non-medical: Not on file   Tobacco Use    Smoking status: Current Some Day Smoker    Smokeless tobacco: Never Used   Substance and Sexual Activity    Alcohol use: No    Drug use: Not on file    Sexual activity: Yes     Partners: Female     Birth control/protection: None     Comment: partner uses birth control   Lifestyle    Physical activity     Days per week: Not on file     Minutes per session: Not on file    Stress: Not on file Relationships    Social connections     Talks on phone: Not on file     Gets together: Not on file     Attends Sabianist service: Not on file     Active member of club or organization: Not on file     Attends meetings of clubs or organizations: Not on file     Relationship status: Not on file    Intimate partner violence     Fear of current or ex partner: Not on file     Emotionally abused: Not on file     Physically abused: Not on file     Forced sexual activity: Not on file   Other Topics Concern    Not on file   Social History Narrative    ** Merged History Encounter **              ALLERGIES: Grape, Milk, Nickel, Soy, and Wheat    Review of Systems   Constitutional: Negative for activity change, appetite change, fatigue and fever. HENT: Negative for congestion, ear discharge, ear pain, rhinorrhea, sneezing and sore throat. Eyes: Negative for pain and redness. Respiratory: Negative for cough, choking and shortness of breath. Cardiovascular: Positive for chest pain. Negative for palpitations and leg swelling. Gastrointestinal: Negative for abdominal pain, constipation, diarrhea, nausea and vomiting. Genitourinary: Negative for dysuria and enuresis. Musculoskeletal: Negative for gait problem and joint swelling. Skin: Negative for pallor, rash and wound. Neurological: Negative for dizziness, syncope, light-headedness, numbness and headaches. Hematological: Does not bruise/bleed easily. All other systems reviewed and are negative. Vitals:    03/02/21 1048   BP: 116/77   Pulse: 85   Resp: 16   Temp: 98 °F (36.7 °C)   SpO2: 99%   Weight: 82.5 kg (181 lb 14.1 oz)            Physical Exam  Vitals signs and nursing note reviewed. Constitutional:       General: He is not in acute distress. Appearance: He is well-developed. He is not diaphoretic. HENT:      Head: Normocephalic and atraumatic.       Right Ear: External ear normal.      Left Ear: External ear normal.      Nose: Nose normal.      Mouth/Throat:      Pharynx: No oropharyngeal exudate. Eyes:      General: No scleral icterus. Right eye: No discharge. Left eye: No discharge. Conjunctiva/sclera: Conjunctivae normal.      Pupils: Pupils are equal, round, and reactive to light. Neck:      Musculoskeletal: Normal range of motion and neck supple. Cardiovascular:      Rate and Rhythm: Normal rate and regular rhythm. Heart sounds: Normal heart sounds. Heart sounds not distant. No murmur. No friction rub. No gallop. No S3 or S4 sounds. Pulmonary:      Effort: Pulmonary effort is normal. No tachypnea, accessory muscle usage or respiratory distress. Breath sounds: Normal breath sounds. No stridor. No wheezing or rales. Chest:      Chest wall: No mass, tenderness or edema. Abdominal:      General: Bowel sounds are normal. There is no distension. Palpations: Abdomen is soft. Tenderness: There is no abdominal tenderness. There is no guarding or rebound. Musculoskeletal: Normal range of motion. General: No tenderness. Right lower leg: He exhibits no tenderness. No edema. Left lower leg: He exhibits no tenderness. No edema. Lymphadenopathy:      Cervical: No cervical adenopathy. Skin:     General: Skin is warm and dry. Capillary Refill: Capillary refill takes less than 2 seconds. Coloration: Skin is not pale. Findings: No erythema or rash. Neurological:      Mental Status: He is alert and oriented to person, place, and time. Motor: No abnormal muscle tone. Psychiatric:         Behavior: Behavior normal.          MDM  Number of Diagnoses or Management Options  Diagnosis management comments: History and physical exam most consistent with a musculoskeletal cause of the discomfort. EKG with NSR. CXR normal.  Labs including troponin reassuring.   Given the recurrent symptoms with give the patient a referral to cardiology for evaluation if symptoms progress but at this time recommend motrin for discomfort.        Amount and/or Complexity of Data Reviewed  Clinical lab tests: ordered and reviewed  Tests in the radiology section of CPT®: ordered and reviewed  Tests in the medicine section of CPT®: ordered and reviewed  Review and summarize past medical records: yes  Independent visualization of images, tracings, or specimens: yes    Risk of Complications, Morbidity, and/or Mortality  Presenting problems: moderate  Diagnostic procedures: moderate  Management options: moderate    Patient Progress  Patient progress: improved         Procedures

## 2021-03-02 NOTE — ED TRIAGE NOTES
Pt c/o intermittent left sided chest pain for months. Pt seen here in November for the same and had negative workup. Pt has no pain at this time.

## 2021-03-02 NOTE — Clinical Note
[address] 
[address] [facility] 
[address] 
[phone] Work/School Note Date: 3/2/2021 To Whom It May concern: 
 
 
Joshua Leonard was seen and treated today in the emergency room by the following provider(s): 
Attending Provider: Nichole Jensen MD.   
 
Joshua Leonard is excused from work/school on 03/02/21. He is clear to return to work/school on 03/03/21. Sincerely, Rudi Manuel MD

## 2021-09-02 ENCOUNTER — HOSPITAL ENCOUNTER (EMERGENCY)
Age: 26
Discharge: LEFT BEFORE DISCHARGE | End: 2021-09-02

## 2021-09-02 VITALS
HEART RATE: 89 BPM | OXYGEN SATURATION: 97 % | SYSTOLIC BLOOD PRESSURE: 107 MMHG | TEMPERATURE: 97.6 F | RESPIRATION RATE: 18 BRPM | DIASTOLIC BLOOD PRESSURE: 74 MMHG

## 2021-09-02 PROCEDURE — 75810000275 HC EMERGENCY DEPT VISIT NO LEVEL OF CARE

## 2021-09-03 NOTE — ED TRIAGE NOTES
TRIAGE NOTE:  Patient arrives with c/o head congestion, throat hurts, and cough, and bites on his ankles that started on Monday. Patient reports he has had both his vaccinations.

## 2021-12-29 ENCOUNTER — HOSPITAL ENCOUNTER (EMERGENCY)
Age: 26
Discharge: HOME | End: 2021-12-29
Attending: EMERGENCY MEDICINE

## 2021-12-29 ENCOUNTER — APPOINTMENT (OUTPATIENT)
Dept: GENERAL RADIOLOGY | Age: 26
End: 2021-12-29
Attending: PHYSICIAN ASSISTANT

## 2021-12-29 VITALS
OXYGEN SATURATION: 98 % | HEART RATE: 77 BPM | RESPIRATION RATE: 18 BRPM | DIASTOLIC BLOOD PRESSURE: 86 MMHG | TEMPERATURE: 97.6 F | SYSTOLIC BLOOD PRESSURE: 135 MMHG

## 2021-12-29 DIAGNOSIS — S61.219A LACERATION OF FINGER OF RIGHT HAND WITHOUT FOREIGN BODY WITHOUT DAMAGE TO NAIL, UNSPECIFIED FINGER, INITIAL ENCOUNTER: Primary | ICD-10-CM

## 2021-12-29 PROCEDURE — 73130 X-RAY EXAM OF HAND: CPT

## 2021-12-29 PROCEDURE — 75810000293 HC SIMP/SUPERF WND  RPR

## 2021-12-29 PROCEDURE — 99283 EMERGENCY DEPT VISIT LOW MDM: CPT

## 2021-12-29 PROCEDURE — 90471 IMMUNIZATION ADMIN: CPT

## 2021-12-29 PROCEDURE — 74011000250 HC RX REV CODE- 250: Performed by: PHYSICIAN ASSISTANT

## 2021-12-29 PROCEDURE — 74011250637 HC RX REV CODE- 250/637: Performed by: PHYSICIAN ASSISTANT

## 2021-12-29 PROCEDURE — 90715 TDAP VACCINE 7 YRS/> IM: CPT | Performed by: PHYSICIAN ASSISTANT

## 2021-12-29 PROCEDURE — 74011250636 HC RX REV CODE- 250/636: Performed by: PHYSICIAN ASSISTANT

## 2021-12-29 RX ORDER — BACITRACIN 500 UNIT/G
1 PACKET (EA) TOPICAL ONCE
Status: COMPLETED | OUTPATIENT
Start: 2021-12-29 | End: 2021-12-29

## 2021-12-29 RX ORDER — LIDOCAINE HYDROCHLORIDE 10 MG/ML
10 INJECTION INFILTRATION; PERINEURAL
Status: COMPLETED | OUTPATIENT
Start: 2021-12-29 | End: 2021-12-29

## 2021-12-29 RX ORDER — IBUPROFEN 600 MG/1
600 TABLET ORAL
Status: COMPLETED | OUTPATIENT
Start: 2021-12-29 | End: 2021-12-29

## 2021-12-29 RX ADMIN — BACITRACIN 1 PACKET: 500 OINTMENT TOPICAL at 20:11

## 2021-12-29 RX ADMIN — LIDOCAINE HYDROCHLORIDE 10 ML: 10 INJECTION, SOLUTION INFILTRATION; PERINEURAL at 20:10

## 2021-12-29 RX ADMIN — TETANUS TOXOID, REDUCED DIPHTHERIA TOXOID AND ACELLULAR PERTUSSIS VACCINE, ADSORBED 0.5 ML: 5; 2.5; 8; 8; 2.5 SUSPENSION INTRAMUSCULAR at 20:11

## 2021-12-29 RX ADMIN — IBUPROFEN 600 MG: 600 TABLET ORAL at 20:11

## 2021-12-30 NOTE — DISCHARGE INSTRUCTIONS
Return for new or worsening symptoms. Leave your dressing in place for 48 hours. After that, remove it daily, wash with warm water and soap, apply a thick layer of antibiotic ointment and apply a bulky dressing. Call the orthopedic office tomorrow to make a follow-up appointment to be seen next week, explained that you were seen in the emergency department for hand injury and we are concerned that you may have damaged the tendon.

## 2021-12-30 NOTE — ED PROVIDER NOTES
27year old L handed male presenting to the ED for RIGHT hand pain. About 1 hour ago caught his RIGHT hand in his apartment door. Patient reports that he has moderately severe pain in the third and fourth fingers, worse with movement and palpation, also notes that he sustained some lacerations. Unsure of last tetanus. No treatment prior to arrival.  No other concerns. PMHx: asthma  Psx: L knee, tonsils  Allergies: grapes, batsheva, wheat, and soy  Social: non-smoker. Occasional marijuana, occasional beer. Occasional black and milds. Works at [employer] as a cook    The history is provided by the patient. Laceration  Pertinent negatives include no chest pain and no shortness of breath. Past Medical History:   Diagnosis Date    Asthma        Past Surgical History:   Procedure Laterality Date    HX ORTHOPAEDIC      left knee surgery    HX TONSILLECTOMY  2005? No family history on file. Social History     Socioeconomic History    Marital status: SINGLE     Spouse name: Not on file    Number of children: Not on file    Years of education: Not on file    Highest education level: Not on file   Occupational History    Not on file   Tobacco Use    Smoking status: Current Some Day Smoker    Smokeless tobacco: Never Used   Substance and Sexual Activity    Alcohol use: No    Drug use: Not on file    Sexual activity: Yes     Partners: Female     Birth control/protection: None     Comment: partner uses birth control   Other Topics Concern    Not on file   Social History Narrative    ** Merged History Encounter **          Social Determinants of Health     Financial Resource Strain:     Difficulty of Paying Living Expenses: Not on file   Food Insecurity:     Worried About Running Out of Food in the Last Year: Not on file    Irene of Food in the Last Year: Not on file   Transportation Needs:     Lack of Transportation (Medical): Not on file    Lack of Transportation (Non-Medical):  Not on file   Physical Activity:     Days of Exercise per Week: Not on file    Minutes of Exercise per Session: Not on file   Stress:     Feeling of Stress : Not on file   Social Connections:     Frequency of Communication with Friends and Family: Not on file    Frequency of Social Gatherings with Friends and Family: Not on file    Attends Latter day Services: Not on file    Active Member of [address] [place] [address] or Organizations: Not on file    Attends Club or Organization Meetings: Not on file    Marital Status: Not on file   Intimate Partner Violence:     Fear of Current or Ex-Partner: Not on file    Emotionally Abused: Not on file    Physically Abused: Not on file    Sexually Abused: Not on file   Housing Stability:     Unable to Pay for Housing in the Last Year: Not on file    Number of [place] in the Last Year: Not on file    Unstable Housing in the Last Year: Not on file         ALLERGIES: Grape, Milk, Nickel, Soy, and Wheat    Review of Systems   Constitutional: Negative for fever. HENT: Negative for facial swelling. Respiratory: Negative for shortness of breath. Cardiovascular: Negative for chest pain. Gastrointestinal: Negative for vomiting. Musculoskeletal: Positive for arthralgias and joint swelling. Skin: Positive for wound. Neurological: Negative for syncope. All other systems reviewed and are negative. Vitals:    12/29/21 1948 12/29/21 2144   BP: (!) 145/84 135/86   Pulse: 98 77   Resp: 16 18   Temp: 98.3 °F (36.8 °C) 97.6 °F (36.4 °C)   SpO2: 97% 98%            Physical Exam  Vitals and nursing note reviewed. Constitutional:       Appearance: He is well-developed. Comments: Pleasant, well-appearing, no distress   HENT:      Head: Normocephalic. Eyes:      Conjunctiva/sclera: Conjunctivae normal.   Cardiovascular:      Rate and Rhythm: Normal rate. Pulmonary:      Effort: Pulmonary effort is normal. No respiratory distress.    Musculoskeletal:      Cervical back: Neck supple. Comments: Right hand: Swelling of the third and fourth digits, see photos. Patient able to flex and extend at all joints, however flexion and extension are somewhat limited due to swelling and pain. Normal distal cap refill, sensation. Nailbeds intact. Skin:     General: Skin is warm and dry. Neurological:      Mental Status: He is alert and oriented to person, place, and time. MDM  Number of Diagnoses or Management Options  Laceration of finger of right hand without foreign body without damage to nail, unspecified finger, initial encounter  Diagnosis management comments: 53-year-old male presenting to the ED for right hand injury after he shut it in his apartment door. Patient with 2 small wounds that were cleaned and repaired. No fracture. Patient is unable to fully extend the fourth finger at the PIP joint, discussed with him possibility of tendon injury, encourage patient to call orthopedics tomorrow to make a follow-up appointment to be seen for reassessment. Amount and/or Complexity of Data Reviewed  Tests in the radiology section of CPT®: ordered and reviewed  Discuss the patient with other providers: yes (Dr. Lakeisha Hutchinson ED attending)           Nerve Block    Date/Time: 12/29/2021 8:50 PM  Performed by: BENJIE Stauffer  Authorized by: BENJIE Stauffer     Consent:     Consent obtained:  Verbal    Consent given by:  Patient  Indications:     Indications:  Pain relief and procedural anesthesia  Location:     Laterality: right 3rd and 4th fingers. Pre-procedure details:     Procedure prep: wound . Skin anesthesia (see MAR for exact dosages):     Skin anesthesia method:  None  Procedure details (see MAR for exact dosages):      Block needle gauge:  25 G    Anesthetic injected:  Lidocaine 1% w/o epi    Steroid injected:  None    Additive injected:  None  Post-procedure details:     Dressing:  None    Outcome:  Anesthesia achieved    Patient tolerance of procedure: Tolerated well, no immediate complications  Wound Repair    Date/Time: 12/29/2021 9:34 PM  Performed by: [id number] [employer] provider: Dr. Valarie Carter  Location: R 3rd and 4th fingers. Wound length:2.6 - 7.5 cm (4)  Anesthesia: digital block  Foreign bodies: no foreign bodies  Irrigation solution: saline and tap water  Irrigation method: tap and syringe  Debridement: none  Skin closure: 5-0 nylon  Number of sutures: 4  Technique: simple and interrupted  Approximation: close  Dressing: antibiotic ointment  My total time at bedside, performing this procedure was 16-30 minutes.   Comments: 2 sutures in each finger, 4 total.  After digital block, reassessed range of motion, patient is not able to fully extend the fourth finger at the PIP joint

## 2022-01-08 ENCOUNTER — HOSPITAL ENCOUNTER (EMERGENCY)
Age: 27
Discharge: HOME | End: 2022-01-08
Attending: EMERGENCY MEDICINE

## 2022-01-08 VITALS
SYSTOLIC BLOOD PRESSURE: 135 MMHG | HEART RATE: 89 BPM | DIASTOLIC BLOOD PRESSURE: 78 MMHG | TEMPERATURE: 97.7 F | OXYGEN SATURATION: 100 % | RESPIRATION RATE: 16 BRPM

## 2022-01-08 DIAGNOSIS — S61.214D LACERATION OF RIGHT RING FINGER WITHOUT FOREIGN BODY WITHOUT DAMAGE TO NAIL, SUBSEQUENT ENCOUNTER: ICD-10-CM

## 2022-01-08 DIAGNOSIS — Z48.02 ENCOUNTER FOR REMOVAL OF SUTURES: Primary | ICD-10-CM

## 2022-01-08 PROCEDURE — 99282 EMERGENCY DEPT VISIT SF MDM: CPT

## 2022-01-08 NOTE — ED TRIAGE NOTES
Patient arrives ambulatory to the ED with chief complaint of stitch removal. Patient states he got stiches on her R hand, R middle finger, and R ring finger on 12/29/21.

## 2022-01-08 NOTE — Clinical Note
[address]  [address] [facility] [address]  [phone]    Work/School Note    Date: 1/8/2022    To Whom It May concern:      Sri Fox was seen and treated today in the emergency room by the following provider(s):  Attending Provider: Xiao Karimi MD  Physician Assistant: BENJIE Deras. Sri Fox is excused from work/school on 01/08/22. He is clear to return to work/school on 01/09/22.         Sincerely,          BENJIE Hernandez

## 2022-01-08 NOTE — ED PROVIDER NOTES
30-year-old male with history of asthma presents to ED for suture removal.  Patient was in ED on 12/29/2021 for a laceration of his fingers of his right hand. 4 sutures were placed. He feels that it has been healing fine, he has been trying to avoid moving them too much. Denies fevers, chills, drainage, redness, or swelling. Past Medical History:   Diagnosis Date    Asthma        Past Surgical History:   Procedure Laterality Date    HX ORTHOPAEDIC      left knee surgery    HX TONSILLECTOMY  2005? No family history on file. Social History     Socioeconomic History    Marital status: SINGLE     Spouse name: Not on file    Number of children: Not on file    Years of education: Not on file    Highest education level: Not on file   Occupational History    Not on file   Tobacco Use    Smoking status: Current Some Day Smoker    Smokeless tobacco: Never Used   Substance and Sexual Activity    Alcohol use: No    Drug use: Not on file    Sexual activity: Yes     Partners: Female     Birth control/protection: None     Comment: partner uses birth control   Other Topics Concern    Not on file   Social History Narrative    ** Merged History Encounter **          Social Determinants of Health     Financial Resource Strain:     Difficulty of Paying Living Expenses: Not on file   Food Insecurity:     Worried About Running Out of Food in the Last Year: Not on file    Irene of Food in the Last Year: Not on file   Transportation Needs:     Lack of Transportation (Medical): Not on file    Lack of Transportation (Non-Medical):  Not on file   Physical Activity:     Days of Exercise per Week: Not on file    Minutes of Exercise per Session: Not on file   Stress:     Feeling of Stress : Not on file   Social Connections:     Frequency of Communication with Friends and Family: Not on file    Frequency of Social Gatherings with Friends and Family: Not on file    Attends [employer] Services: Not on file   [facility] [employer] or Organizations: Not on file    Attends Club or Organization Meetings: Not on file    Marital Status: Not on file   Intimate Partner Violence:     Fear of Current or Ex-Partner: Not on file    Emotionally Abused: Not on file    Physically Abused: Not on file    Sexually Abused: Not on file   Housing Stability:     Unable to Pay for Housing in the Last Year: Not on file    Number of [place] in the Last Year: Not on file    Unstable Housing in the Last Year: Not on file         ALLERGIES: Grape, Milk, Nickel, Soy, and Wheat    Review of Systems   Constitutional: Negative for fever. HENT: Negative for congestion and sinus pain. Respiratory: Negative for cough. Cardiovascular: Negative for chest pain. Gastrointestinal: Negative for nausea and vomiting. Genitourinary: Negative for dysuria. Musculoskeletal: Negative for myalgias. Skin: Positive for wound. Neurological: Negative for headaches. Hematological: Negative for adenopathy. All other systems reviewed and are negative. Vitals:    01/08/22 1025   BP: 135/78   Pulse: 89   Resp: 16   Temp: 97.7 °F (36.5 °C)   SpO2: 100%            Physical Exam  Vitals and nursing note reviewed. Constitutional:       Appearance: Normal appearance. Eyes:      Extraocular Movements: Extraocular movements intact. Pupils: Pupils are equal, round, and reactive to light. Cardiovascular:      Rate and Rhythm: Normal rate and regular rhythm. Heart sounds: Normal heart sounds. Pulmonary:      Effort: Pulmonary effort is normal.      Breath sounds: Normal breath sounds. Abdominal:      Palpations: Abdomen is soft. Tenderness: There is no abdominal tenderness. Lymphadenopathy:      Cervical: No cervical adenopathy. Skin:     General: Skin is warm and dry.       Findings: Laceration (noted to PIP of 3rd and 4th fingers of R hand well-healing with 2 sutures each; no signs of erythema, edema, or drainage) present. Neurological:      General: No focal deficit present. Mental Status: He is alert and oriented to person, place, and time. Psychiatric:         Mood and Affect: Mood normal.         Behavior: Behavior normal.          MDM  Number of Diagnoses or Management Options  Encounter for removal of sutures  Laceration of right ring finger without foreign body without damage to nail, subsequent encounter  Diagnosis management comments: 47-year-old male presents to ED for suture removal.  Physical exam reveals laceration noted to PIP of third and fourth fingers of right hand which is well-healing with 2 sutures each and no signs of infection. Sutures removed without complication. Patient stable for discharge with instructions for follow up and conservative care at home. Amount and/or Complexity of Data Reviewed  Discuss the patient with other providers: yes           Suture/Staple Removal    Date/Time: 1/8/2022 11:56 AM  Performed by: BENJIE Espinoza  Authorized by: BENJIE Espinoza     Consent:     Consent obtained:  Verbal    Consent given by:  Patient    Risks discussed:  Bleeding and pain    Alternatives discussed:  No treatment  Location:     Location:  Upper extremity    Upper extremity location:  Hand    Hand location:  R hand, R ring finger and R long finger  Procedure details:     Wound appearance:  No signs of infection, good wound healing and clean    Number of sutures removed:  4  Post-procedure details:     Post-removal:  Band-Aid applied and antibiotic ointment applied    Patient tolerance of procedure:   Tolerated well, no immediate complications

## 2022-09-17 ENCOUNTER — APPOINTMENT (OUTPATIENT)
Dept: GENERAL RADIOLOGY | Age: 27
End: 2022-09-17
Attending: EMERGENCY MEDICINE

## 2022-09-17 ENCOUNTER — HOSPITAL ENCOUNTER (EMERGENCY)
Age: 27
Discharge: HOME | End: 2022-09-17
Attending: EMERGENCY MEDICINE

## 2022-09-17 VITALS
WEIGHT: 175 LBS | HEART RATE: 91 BPM | SYSTOLIC BLOOD PRESSURE: 145 MMHG | DIASTOLIC BLOOD PRESSURE: 84 MMHG | OXYGEN SATURATION: 98 % | TEMPERATURE: 98.2 F | RESPIRATION RATE: 16 BRPM | BODY MASS INDEX: 21.76 KG/M2 | HEIGHT: 75 IN

## 2022-09-17 DIAGNOSIS — J02.9 SORE THROAT: ICD-10-CM

## 2022-09-17 DIAGNOSIS — B34.9 VIRAL ILLNESS: ICD-10-CM

## 2022-09-17 DIAGNOSIS — R05.9 COUGH: Primary | ICD-10-CM

## 2022-09-17 LAB
FLUAV AG NPH QL IA: NEGATIVE
FLUBV AG NOSE QL IA: NEGATIVE
SARS-COV-2, COV2: NORMAL
SARS-COV-2, XPLCVT: DETECTED
SOURCE, COVRS: ABNORMAL

## 2022-09-17 PROCEDURE — 71045 X-RAY EXAM CHEST 1 VIEW: CPT

## 2022-09-17 PROCEDURE — 99283 EMERGENCY DEPT VISIT LOW MDM: CPT

## 2022-09-17 PROCEDURE — 87804 INFLUENZA ASSAY W/OPTIC: CPT

## 2022-09-17 PROCEDURE — U0005 INFEC AGEN DETEC AMPLI PROBE: HCPCS

## 2022-09-17 NOTE — ED TRIAGE NOTES
Patient arrives reporting cough for a few days, cold chills, generalized body aches and sore throat. Taking mucinex at home.

## 2022-09-17 NOTE — ED PROVIDER NOTES
[facility]   22-year-old male with past medical history of asthma presenting due to cough, sore throat, and body aches. Symptoms started about 3 days ago. He endorses a dry cough. No shortness of breath. No fevers. He says it hurts to swallow but he can tolerate oral intake without difficulty. No vomiting. No abdominal pain. No chest pain. No known sick contacts. He says he is vaccinated for COVID. He has taken some Mucinex at home without relief of his symptoms. Past Medical History:   Diagnosis Date    Asthma        Past Surgical History:   Procedure Laterality Date    HX ORTHOPAEDIC      left knee surgery    HX TONSILLECTOMY  2005? History reviewed. No pertinent family history.     Social History     Socioeconomic History    Marital status: SINGLE     Spouse name: Not on file    Number of children: Not on file    Years of education: Not on file    Highest education level: Not on file   Occupational History    Not on file   Tobacco Use    Smoking status: Some Days     Passive exposure: Current    Smokeless tobacco: Never   Vaping Use    Vaping Use: Never used   Substance and Sexual Activity    Alcohol use: Yes    Drug use: Yes     Types: Marijuana    Sexual activity: Yes     Partners: Female     Birth control/protection: None     Comment: partner uses birth control   Other Topics Concern    Not on file   Social History Narrative    ** Merged History Encounter **          Social Determinants of Health     Financial Resource Strain: Not on file   Food Insecurity: Not on file   Transportation Needs: Not on file   Physical Activity: Not on file   Stress: Not on file   Social Connections: Not on file   Intimate Partner Violence: Not on file   Housing Stability: Not on file         ALLERGIES: Grape, Milk, Nickel, Soy, and Wheat    Review of Systems  All systems reviewed are negative unless otherwise documented in the [facility]    Vitals:    09/17/22 1019   BP: 130/65   Pulse: 91   Resp: 16   Temp: 98.2 °F (36.8 °C)   SpO2: 96%   Weight: 79.4 kg (175 lb)   Height: 6' 3\" (1.905 m)            Physical Exam  Constitutional:       Comments: Resting comfortably in no acute distress   HENT:      Mouth/Throat:      Comments: Moist mucous membranes. Posterior oropharynx is mildly erythematous but no swelling or exudates noted. Eyes:      Conjunctiva/sclera: Conjunctivae normal.      Comments: Extraocular movements intact   Neck:      Comments: Nontender cervical lymphadenopathy present  Cardiovascular:      Comments: Regular rate and rhythm without murmurs. 2+ radial pulses bilaterally  Pulmonary:      Comments: Breath sounds are clear bilaterally. No wheezing or rales. No respiratory distress  Abdominal:      Comments: Soft and nontender   Skin:     General: Skin is warm and dry. Neurological:      Comments: Awake and alert. GCS 15        MDM  82-year-old man presenting with the above chief complaint. Vital signs are stable. He has clear lungs. Oropharynx is clear without exudates or swelling. He describes a viral type illness. COVID PCR will be obtained and flu swab in addition to a chest x-ray will be obtained. I offer the patient some Toradol for sore throat and body aches but he declined and stated he would take some ibuprofen at home. Chest x-ray clear. Flu swab negative. Patient deemed appropriate for discharge. He understands the plan going forward and was discharged in stable condition.        Procedures

## 2022-09-17 NOTE — DISCHARGE INSTRUCTIONS
Return to the emergency department with any new or worsening symptoms. I recommend you try ibuprofen and Tylenol alternating these medications every 4 hours as needed for sore throat and pain. Otherwise please use the [employer] application to follow-up on your COVID-19 testing result. If you test positive you should go to [facility] for clarifications on the length of time you need to quarantine.

## 2022-09-17 NOTE — ED NOTES
Patient given discharge papers and instructions by this RN. Patient verbalized understanding and stated not having questions or concern regarding his care. Patient ambulatory out of ED in no new acute distress.

## 2024-04-04 ENCOUNTER — APPOINTMENT (OUTPATIENT)
Facility: HOSPITAL | Age: 29
End: 2024-04-04

## 2024-04-04 ENCOUNTER — HOSPITAL ENCOUNTER (EMERGENCY)
Facility: HOSPITAL | Age: 29
Discharge: HOME | End: 2024-04-04
Attending: EMERGENCY MEDICINE

## 2024-04-04 VITALS
TEMPERATURE: 98.2 F | OXYGEN SATURATION: 99 % | WEIGHT: 165.12 LBS | HEART RATE: 72 BPM | DIASTOLIC BLOOD PRESSURE: 100 MMHG | SYSTOLIC BLOOD PRESSURE: 139 MMHG | HEIGHT: 76 IN | BODY MASS INDEX: 20.11 KG/M2 | RESPIRATION RATE: 16 BRPM

## 2024-04-04 DIAGNOSIS — H11.32 SUBCONJUNCTIVAL HEMORRHAGE OF LEFT EYE: ICD-10-CM

## 2024-04-04 DIAGNOSIS — R51.9 LEFT FACIAL PAIN: ICD-10-CM

## 2024-04-04 DIAGNOSIS — M25.522 LEFT ELBOW PAIN: ICD-10-CM
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
## 2024-04-04 PROCEDURE — 70486 CT MAXILLOFACIAL W/O DYE: CPT

## 2024-04-04 PROCEDURE — 70450 CT HEAD/BRAIN W/O DYE: CPT

## 2024-04-04 PROCEDURE — 96372 THER/PROPH/DIAG INJ SC/IM: CPT

## 2024-04-04 PROCEDURE — 6360000002 HC RX W HCPCS: Performed by: EMERGENCY MEDICINE

## 2024-04-04 PROCEDURE — 99284 EMERGENCY DEPT VISIT MOD MDM: CPT

## 2024-04-04 RX ORDER — KETOROLAC TROMETHAMINE 30 MG/ML
15 INJECTION, SOLUTION INTRAMUSCULAR; INTRAVENOUS ONCE
Status: COMPLETED | OUTPATIENT
Start: 2024-04-04 | End: 2024-04-04

## 2024-04-04 RX ORDER — ETODOLAC 200 MG/1
200 CAPSULE ORAL 2 TIMES DAILY
Qty: 10 CAPSULE | Refills: 0 | Status: SHIPPED | OUTPATIENT
Start: 2024-04-04 | End: 2024-04-09

## 2024-04-04 RX ORDER — CYCLOBENZAPRINE HCL 5 MG
10 TABLET ORAL 3 TIMES DAILY PRN
Qty: 20 TABLET | Refills: 0 | Status: SHIPPED | OUTPATIENT
Start: 2024-04-04 | End: 2024-04-09

## 2024-04-04 RX ADMIN — KETOROLAC TROMETHAMINE 15 MG: 30 INJECTION, SOLUTION INTRAMUSCULAR; INTRAVENOUS at 11:37

## 2024-04-04 ASSESSMENT — PAIN DESCRIPTION - ORIENTATION: ORIENTATION: LEFT

## 2024-04-04 ASSESSMENT — PAIN DESCRIPTION - DESCRIPTORS: DESCRIPTORS: ACHING;STABBING

## 2024-04-04 ASSESSMENT — PAIN DESCRIPTION - LOCATION: LOCATION: FACE

## 2024-04-04 ASSESSMENT — PAIN SCALES - GENERAL: PAINLEVEL_OUTOF10: 6

## 2024-04-04 ASSESSMENT — PAIN DESCRIPTION - PAIN TYPE: TYPE: ACUTE PAIN
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
## 2024-04-04 NOTE — DISCHARGE INSTRUCTIONS
Routine appointments for health maintenance with a primary care provider are very important and emergency department visits are no substitute.  You should review all findings and test results from your visit today with your primary care physician.        We recommended that you take medications as prescribed.    You may take 1 or 2 pills of Tylenol every 6 hours as needed for pain or fever.  You should not take this medication with other medications that contain acetaminophen/Tylenol which could include prescription pain medications or other combo over-the-counter medications.  You should not exceed more than 4000 mg in a 24 hour.       Please do not drive, operate heavy machinery, swim, bathe or perform any other activities as you may risk injury to yourself or others.    Return to the emergency department for any new or concerning signs/symptoms or failure to improve.
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
